# Patient Record
Sex: MALE | Race: WHITE | NOT HISPANIC OR LATINO | Employment: FULL TIME | URBAN - METROPOLITAN AREA
[De-identification: names, ages, dates, MRNs, and addresses within clinical notes are randomized per-mention and may not be internally consistent; named-entity substitution may affect disease eponyms.]

---

## 2023-07-14 PROBLEM — H69.90 EUSTACHIAN TUBE DYSFUNCTION: Status: ACTIVE | Noted: 2023-07-14

## 2023-07-14 PROBLEM — H93.19 TINNITUS: Status: ACTIVE | Noted: 2023-07-14

## 2023-07-14 RX ORDER — MELOXICAM 15 MG/1
15 TABLET ORAL DAILY
COMMUNITY
End: 2023-07-21 | Stop reason: ALTCHOICE

## 2023-07-20 ASSESSMENT — PROMIS GLOBAL HEALTH SCALE
CARRYOUT_PHYSICAL_ACTIVITIES: MOSTLY
EMOTIONAL_PROBLEMS: RARELY
CARRYOUT_SOCIAL_ACTIVITIES: VERY GOOD
RATE_MENTAL_HEALTH: VERY GOOD
RATE_SOCIAL_SATISFACTION: VERY GOOD
RATE_QUALITY_OF_LIFE: VERY GOOD
RATE_AVERAGE_FATIGUE: MILD
RATE_GENERAL_HEALTH: VERY GOOD
RATE_PHYSICAL_HEALTH: VERY GOOD
RATE_AVERAGE_PAIN: 2

## 2023-07-21 ENCOUNTER — LAB (OUTPATIENT)
Dept: LAB | Facility: LAB | Age: 43
End: 2023-07-21
Payer: COMMERCIAL

## 2023-07-21 ENCOUNTER — OFFICE VISIT (OUTPATIENT)
Dept: PRIMARY CARE | Facility: CLINIC | Age: 43
End: 2023-07-21
Payer: COMMERCIAL

## 2023-07-21 VITALS
HEART RATE: 68 BPM | SYSTOLIC BLOOD PRESSURE: 122 MMHG | RESPIRATION RATE: 12 BRPM | BODY MASS INDEX: 26.69 KG/M2 | WEIGHT: 208 LBS | OXYGEN SATURATION: 98 % | DIASTOLIC BLOOD PRESSURE: 72 MMHG | HEIGHT: 74 IN

## 2023-07-21 DIAGNOSIS — E78.00 ELEVATED LDL CHOLESTEROL LEVEL: ICD-10-CM

## 2023-07-21 DIAGNOSIS — Z12.5 SCREENING FOR PROSTATE CANCER: ICD-10-CM

## 2023-07-21 DIAGNOSIS — R73.01 IMPAIRED FASTING GLUCOSE: ICD-10-CM

## 2023-07-21 DIAGNOSIS — G89.29 CHRONIC BILATERAL LOW BACK PAIN WITHOUT SCIATICA: ICD-10-CM

## 2023-07-21 DIAGNOSIS — M54.50 CHRONIC BILATERAL LOW BACK PAIN WITHOUT SCIATICA: ICD-10-CM

## 2023-07-21 DIAGNOSIS — Z00.00 HEALTHCARE MAINTENANCE: Primary | ICD-10-CM

## 2023-07-21 LAB
ALANINE AMINOTRANSFERASE (SGPT) (U/L) IN SER/PLAS: 19 U/L (ref 10–52)
ALBUMIN (G/DL) IN SER/PLAS: 4.8 G/DL (ref 3.4–5)
ALKALINE PHOSPHATASE (U/L) IN SER/PLAS: 62 U/L (ref 33–120)
ANION GAP IN SER/PLAS: 11 MMOL/L (ref 10–20)
APPEARANCE, URINE: CLEAR
ASPARTATE AMINOTRANSFERASE (SGOT) (U/L) IN SER/PLAS: 16 U/L (ref 9–39)
BILIRUBIN TOTAL (MG/DL) IN SER/PLAS: 0.5 MG/DL (ref 0–1.2)
BILIRUBIN, URINE: NEGATIVE
BLOOD, URINE: NEGATIVE
CALCIUM (MG/DL) IN SER/PLAS: 10.2 MG/DL (ref 8.6–10.6)
CARBON DIOXIDE, TOTAL (MMOL/L) IN SER/PLAS: 30 MMOL/L (ref 21–32)
CHLORIDE (MMOL/L) IN SER/PLAS: 106 MMOL/L (ref 98–107)
CHOLESTEROL (MG/DL) IN SER/PLAS: 190 MG/DL (ref 0–199)
CHOLESTEROL IN HDL (MG/DL) IN SER/PLAS: 49.8 MG/DL
CHOLESTEROL/HDL RATIO: 3.8
COLOR, URINE: YELLOW
CREATININE (MG/DL) IN SER/PLAS: 0.94 MG/DL (ref 0.5–1.3)
GFR MALE: >90 ML/MIN/1.73M2
GLUCOSE (MG/DL) IN SER/PLAS: 95 MG/DL (ref 74–99)
GLUCOSE, URINE: NEGATIVE MG/DL
KETONES, URINE: NEGATIVE MG/DL
LDL: 118 MG/DL (ref 0–99)
LEUKOCYTE ESTERASE, URINE: NEGATIVE
NITRITE, URINE: NEGATIVE
PH, URINE: 5 (ref 5–8)
POTASSIUM (MMOL/L) IN SER/PLAS: 5.2 MMOL/L (ref 3.5–5.3)
PROSTATE SPECIFIC ANTIGEN,SCREEN: 0.71 NG/ML (ref 0–4)
PROTEIN TOTAL: 7.6 G/DL (ref 6.4–8.2)
PROTEIN, URINE: NEGATIVE MG/DL
SODIUM (MMOL/L) IN SER/PLAS: 142 MMOL/L (ref 136–145)
SPECIFIC GRAVITY, URINE: 1.02 (ref 1–1.03)
TRIGLYCERIDE (MG/DL) IN SER/PLAS: 110 MG/DL (ref 0–149)
UREA NITROGEN (MG/DL) IN SER/PLAS: 16 MG/DL (ref 6–23)
UROBILINOGEN, URINE: <2 MG/DL (ref 0–1.9)
VLDL: 22 MG/DL (ref 0–40)

## 2023-07-21 PROCEDURE — 90707 MMR VACCINE SC: CPT | Performed by: FAMILY MEDICINE

## 2023-07-21 PROCEDURE — 1036F TOBACCO NON-USER: CPT | Performed by: FAMILY MEDICINE

## 2023-07-21 PROCEDURE — 90471 IMMUNIZATION ADMIN: CPT | Performed by: FAMILY MEDICINE

## 2023-07-21 PROCEDURE — 80061 LIPID PANEL: CPT

## 2023-07-21 PROCEDURE — 99396 PREV VISIT EST AGE 40-64: CPT | Performed by: FAMILY MEDICINE

## 2023-07-21 PROCEDURE — 80053 COMPREHEN METABOLIC PANEL: CPT

## 2023-07-21 PROCEDURE — 81003 URINALYSIS AUTO W/O SCOPE: CPT

## 2023-07-21 PROCEDURE — 36415 COLL VENOUS BLD VENIPUNCTURE: CPT

## 2023-07-21 PROCEDURE — 84153 ASSAY OF PSA TOTAL: CPT

## 2023-07-21 RX ORDER — METHOCARBAMOL 500 MG/1
500 TABLET, FILM COATED ORAL 3 TIMES DAILY
Qty: 40 TABLET | Refills: 0
Start: 2023-07-21 | End: 2024-03-11 | Stop reason: WASHOUT

## 2023-07-21 ASSESSMENT — ENCOUNTER SYMPTOMS
FATIGUE: 0
ABDOMINAL DISTENTION: 0
ADENOPATHY: 0
BLOOD IN STOOL: 0
NERVOUS/ANXIOUS: 0
BRUISES/BLEEDS EASILY: 0
CHILLS: 0
WEAKNESS: 0
FEVER: 0
BACK PAIN: 1
SORE THROAT: 0
DIARRHEA: 0
DYSURIA: 0
HEADACHES: 0
APPETITE CHANGE: 0
EYE REDNESS: 0
ARTHRALGIAS: 0
SHORTNESS OF BREATH: 0
DYSPHORIC MOOD: 0
CHEST TIGHTNESS: 0
DIZZINESS: 0
DIFFICULTY URINATING: 0
ABDOMINAL PAIN: 0
COUGH: 0
EYE PAIN: 0
CONSTIPATION: 0

## 2023-07-21 NOTE — PROGRESS NOTES
"Subjective   Patient ID: Austin Seo is a 42 y.o. male who presents for Annual Exam.  PMHX, PSHx, Fam hx, and Social hx reviewed.   New concerns - continues to have intermittent low back strains 1-2x per year. Responds to Robaxin as needed. Odynophagia unchanged and had prior negative mitchell with barium swallow study and EGD.   Vaccines MMR due  Dentist seen at least yearly yes  Vision concerns none  Hearing concerns none  Diet is overall healthy.   Smoker - no  Alcohol use - 2 beers per week  Exercising 4 days per week.   Sexually active - yes, no issues  Colonoscopy NA           Review of Systems   Constitutional:  Negative for appetite change, chills, fatigue and fever.   HENT:  Negative for congestion, hearing loss and sore throat.    Eyes:  Negative for pain, redness and visual disturbance.   Respiratory:  Negative for cough, chest tightness and shortness of breath.    Cardiovascular:  Negative for chest pain and leg swelling.   Gastrointestinal:  Negative for abdominal distention, abdominal pain, blood in stool, constipation and diarrhea.   Genitourinary:  Negative for difficulty urinating and dysuria.   Musculoskeletal:  Positive for back pain. Negative for arthralgias.   Skin:  Negative for rash.   Neurological:  Negative for dizziness, weakness and headaches.   Hematological:  Negative for adenopathy. Does not bruise/bleed easily.   Psychiatric/Behavioral:  Negative for dysphoric mood. The patient is not nervous/anxious.        Objective   /72   Pulse 68   Resp 12   Ht 1.88 m (6' 2\")   Wt 94.3 kg (208 lb)   SpO2 98%   BMI 26.71 kg/m²    Physical Exam  Constitutional:       General: He is not in acute distress.     Appearance: Normal appearance. He is not ill-appearing.   HENT:      Head: Normocephalic and atraumatic.      Right Ear: Tympanic membrane, ear canal and external ear normal.      Left Ear: Tympanic membrane, ear canal and external ear normal.      Nose: Nose normal.      Mouth/Throat: "      Mouth: Mucous membranes are moist.      Pharynx: No oropharyngeal exudate or posterior oropharyngeal erythema.   Eyes:      Extraocular Movements: Extraocular movements intact.      Conjunctiva/sclera: Conjunctivae normal.      Pupils: Pupils are equal, round, and reactive to light.   Neck:      Vascular: No carotid bruit.   Cardiovascular:      Rate and Rhythm: Normal rate and regular rhythm.      Heart sounds: Normal heart sounds. No murmur heard.  Pulmonary:      Effort: Pulmonary effort is normal.      Breath sounds: Normal breath sounds. No wheezing, rhonchi or rales.   Abdominal:      General: Bowel sounds are normal. There is no distension.      Palpations: Abdomen is soft. There is no mass.      Tenderness: There is no abdominal tenderness.   Genitourinary:     Prostate: Not enlarged and no nodules present.      Rectum: Guaiac result negative.   Musculoskeletal:         General: No swelling or deformity.      Cervical back: Neck supple. No tenderness.   Lymphadenopathy:      Cervical: No cervical adenopathy.   Skin:     General: Skin is warm and dry.      Findings: No lesion or rash.   Neurological:      Mental Status: He is alert and oriented to person, place, and time.      Sensory: No sensory deficit.      Motor: No weakness.      Coordination: Coordination normal.      Deep Tendon Reflexes: Reflexes normal.   Psychiatric:         Mood and Affect: Mood normal.         Behavior: Behavior normal.         Judgment: Judgment normal.           Assessment/Plan   Diagnoses and all orders for this visit:  Healthcare maintenance - MMR shot given, other vaccines current. Recheck fasting labs.   Chronic bilateral low back pain without sciatica - continue Methocarbamol as needed  Impaired fasting glucose/Elevated LDL cholesterol level - recheck with labs.     Follow up in 1 year, 30min for physical

## 2023-07-21 NOTE — PROGRESS NOTES
"Subjective   Patient ID: Austin Seo is a 42 y.o. male who presents for Annual Exam.    HPI     Review of Systems    Objective   /72   Pulse 68   Resp 12   Ht 1.88 m (6' 2\")   Wt 94.3 kg (208 lb)   SpO2 98%   BMI 26.71 kg/m²     Physical Exam    Assessment/Plan          "

## 2023-07-24 ENCOUNTER — TELEPHONE (OUTPATIENT)
Dept: PRIMARY CARE | Facility: CLINIC | Age: 43
End: 2023-07-24
Payer: COMMERCIAL

## 2023-07-24 NOTE — TELEPHONE ENCOUNTER
----- Message from Mendoza Lang MD sent at 7/22/2023  7:52 AM EDT -----  FBS improved, in normal range. LDL is elevated - recommend low cholesterol diet and regular exercise. Other labs look good.

## 2024-03-11 ENCOUNTER — OFFICE VISIT (OUTPATIENT)
Dept: PRIMARY CARE | Facility: CLINIC | Age: 44
End: 2024-03-11
Payer: COMMERCIAL

## 2024-03-11 VITALS
BODY MASS INDEX: 27.46 KG/M2 | DIASTOLIC BLOOD PRESSURE: 72 MMHG | OXYGEN SATURATION: 96 % | RESPIRATION RATE: 12 BRPM | HEIGHT: 74 IN | SYSTOLIC BLOOD PRESSURE: 126 MMHG | HEART RATE: 78 BPM | WEIGHT: 214 LBS

## 2024-03-11 DIAGNOSIS — R06.83 SNORING: Primary | ICD-10-CM

## 2024-03-11 DIAGNOSIS — Z00.00 HEALTHCARE MAINTENANCE: ICD-10-CM

## 2024-03-11 DIAGNOSIS — R40.0 DAYTIME SOMNOLENCE: ICD-10-CM

## 2024-03-11 DIAGNOSIS — R06.81 WITNESSED APNEIC SPELLS: ICD-10-CM

## 2024-03-11 PROCEDURE — 1036F TOBACCO NON-USER: CPT | Performed by: FAMILY MEDICINE

## 2024-03-11 PROCEDURE — 99213 OFFICE O/P EST LOW 20 MIN: CPT | Performed by: FAMILY MEDICINE

## 2024-03-11 RX ORDER — BISMUTH SUBSALICYLATE 262 MG
1 TABLET,CHEWABLE ORAL DAILY
COMMUNITY

## 2024-03-11 ASSESSMENT — ENCOUNTER SYMPTOMS
FATIGUE: 0
ABDOMINAL DISTENTION: 0
CHEST TIGHTNESS: 0
WEAKNESS: 0
ARTHRALGIAS: 0
BACK PAIN: 0
CHILLS: 0
CONSTIPATION: 0
SHORTNESS OF BREATH: 0
HEADACHES: 0
DYSURIA: 0
BLOOD IN STOOL: 0
ADENOPATHY: 0
DIZZINESS: 0
FEVER: 0
DYSPHORIC MOOD: 0
SORE THROAT: 0
ABDOMINAL PAIN: 0
NERVOUS/ANXIOUS: 0
DIARRHEA: 0
DIFFICULTY URINATING: 0
APPETITE CHANGE: 0
EYE REDNESS: 0
EYE PAIN: 0
COUGH: 0
BRUISES/BLEEDS EASILY: 0

## 2024-03-11 NOTE — PROGRESS NOTES
"Subjective   Patient ID: Austin Seo is a 43 y.o. male who presents for Snoring.    HPI     Review of Systems    Objective   /72   Pulse 78   Resp 12   Ht 1.88 m (6' 2\")   Wt 97.1 kg (214 lb)   SpO2 96%   BMI 27.48 kg/m²     Physical Exam    Assessment/Plan          "

## 2024-03-11 NOTE — PROGRESS NOTES
"Subjective   Patient ID: Austin Seo is a 43 y.o. male who presents for Snoring.  Pt has loud snoring chronically. Also duaghter noted long pauses between breaths and gasping while asleep . Daytime energy is mildly low.  Pt reports symptoms are worsening.   It occurs with frequency of daily  .   Pt has not tried anything.         Review of Systems   Constitutional:  Negative for appetite change, chills, fatigue and fever.   HENT:  Negative for congestion, hearing loss and sore throat.    Eyes:  Negative for pain, redness and visual disturbance.   Respiratory:  Negative for cough, chest tightness and shortness of breath.    Cardiovascular:  Negative for chest pain and leg swelling.   Gastrointestinal:  Negative for abdominal distention, abdominal pain, blood in stool, constipation and diarrhea.   Genitourinary:  Negative for difficulty urinating and dysuria.   Musculoskeletal:  Negative for arthralgias and back pain.   Skin:  Negative for rash.   Neurological:  Negative for dizziness, weakness and headaches.   Hematological:  Negative for adenopathy. Does not bruise/bleed easily.   Psychiatric/Behavioral:  Negative for dysphoric mood. The patient is not nervous/anxious.        Objective   /72   Pulse 78   Resp 12   Ht 1.88 m (6' 2\")   Wt 97.1 kg (214 lb)   SpO2 96%   BMI 27.48 kg/m²    Physical Exam  Constitutional:       General: He is not in acute distress.     Appearance: Normal appearance.   HENT:      Right Ear: Tympanic membrane normal.      Left Ear: Tympanic membrane normal.      Nose: No congestion.      Mouth/Throat:      Pharynx: No posterior oropharyngeal erythema.      Comments: Low lying soft palate  Cardiovascular:      Rate and Rhythm: Normal rate and regular rhythm.      Heart sounds: Normal heart sounds. No murmur heard.  Pulmonary:      Effort: Pulmonary effort is normal.      Breath sounds: Normal breath sounds.   Abdominal:      Palpations: Abdomen is soft.      Tenderness: There " is no abdominal tenderness.   Neurological:      Mental Status: He is alert.   Psychiatric:         Mood and Affect: Mood normal.         Judgment: Judgment normal.         Assessment/Plan   Diagnoses and all orders for this visit:  Snoring/Daytime somnolence/Witnessed apneic spells- check home sleep study.

## 2024-03-16 ENCOUNTER — CLINICAL SUPPORT (OUTPATIENT)
Dept: SLEEP MEDICINE | Facility: HOSPITAL | Age: 44
End: 2024-03-16
Payer: COMMERCIAL

## 2024-03-16 DIAGNOSIS — R06.83 SNORING: ICD-10-CM

## 2024-03-16 DIAGNOSIS — R06.81 WITNESSED APNEIC SPELLS: ICD-10-CM

## 2024-03-16 DIAGNOSIS — R40.0 DAYTIME SOMNOLENCE: ICD-10-CM

## 2024-03-16 PROCEDURE — 95806 SLEEP STUDY UNATT&RESP EFFT: CPT | Performed by: PSYCHIATRY & NEUROLOGY

## 2024-03-28 DIAGNOSIS — G47.33 OSA (OBSTRUCTIVE SLEEP APNEA): Primary | ICD-10-CM

## 2024-03-29 NOTE — PROGRESS NOTES
[facility]  [address]  [facility]  [address]  [place] [id number]     [facility]  New Visit Note      Subjective   Patient ID: Austin Seo is a 43 y.o. male with past medical history significant for Overweight, Obstructive sleep apnea     4/4/2024: The patient is here alone today and was referred by PCP Mendoza Lang MD, for comprehensive sleep medicine evaluation due to  Snoring. Pt has loud snoring chronically. Also, his daughter noted long pauses between breaths and gasping while asleep. Daytime energy is mildly low. The reports symptoms are worsening. It occurs with a frequency of daily. Pt has not tried anything. Today, he came to the sleep clinic to establish care to treat his Obstructive sleep apnea. His  ESS is 9, and  MIKE is 4 today.      HPI  Patient had been having these symptoms for the past 10 years. Patient had Home Sleep Study in 2024 which showed JESSICA but no CPAP started yet.      SLEEP STUDY HISTORY: (personally reviewed raw data such as interpretation report, data sheet, hypnogram, and titration table if available and applicable)  3/16/24: Home Sleep Study: AHI 3%: 35.9/hr, Supine AHI 3%: 41.4/hr, Luis: 66.7%, <88%: 129.9 minutes.    SLEEP-WAKE SCHEDULE  Bedtime: 11 PM on weekdays, MN on weekends  Subjective sleep latency: 10 minutes  Problems falling asleep: no  Number of awakenings: 0 times per night spontaneously for unknown reasons  Problems staying asleep: no  Final wake time: 6 AM on weekdays, 8 AM on weekends  Shift work: no  Naps: No  Average sleep duration (excluding naps): 7 hours    SLEEP ENVIRONMENT  Sleep location: bed  Sleep status: sleeps with wife  Room is dark:  Yes  Room is quiet: Yes  Room is cool: Yes  Bed comfort: good    SLEEP HABITS:   Activities before bedtime: watch TV  Activities in bed: watch TV  Preferred sleep position: back    SLEEP  ROS:  Night symptoms: POSITIVE for snoring, witnessed apnea, and wake up gasping and/or choking for air  Morning symptoms: POSITIVE for unrefreshing sleep  Daytime symptoms POSITIVE for excessive daytime sleepiness and fatigue  Hypersomnia / narcolepsy symptoms: Patient denies symptoms of a hypersomnolence disorder such as sleep paralysis, sleep-related hallucinations, recurrent sleep attacks, automatic behaviors, and cataplexy.   RLS symptoms: Patient denies RLS symptoms.  Movements in sleep: Patient denies problematic movements in sleep.  Parasomnia symptoms: Patient denies symptoms of parasomnia.    WEIGHT: stable    REVIEW OF SYSTEMS: All other systems have been reviewed and are negative.    PERTINENT SOCIAL HISTORY:  Occupation: Manager at [employer]   Smoking: Yes   ETOH: No   Marijuana: No   Caffeine: Yes  Sleep aids: No   Claustrophobia: No     PERTINENT PAST SURGICAL HISTORY:  tonsillectomy    PERTINENT FAMILY HISTORY:  Cousin: Obstructive sleep apnea with CPAP    Active Problems, Allergy List, Medication List, and PMH/PSH/FH/Social Hx have been reviewed and reconciled in chart. No significant changes unless documented in the pertinent chart section. Updates made when necessary.       Objective   Vitals:    04/04/24 1405   BP: 109/68   Pulse: 76   Resp: 16   Temp: 36.6 °C (97.8 °F)   SpO2: 97%        Physical Exam  Constitutional:alert and oriented to time, place, and person  Sinus: - tenderness to palpation  Palate: Narrow Mallampati 2, - Tongue scalloping, - macroglossia  Lungs: Clear to auscultation bilateral, no rales  Heart: Regular rate and rhythm, no murmurs    Assessment/Plan   Austin Seo is a 43 y.o. male who is seen to evaluate for severe obstructive sleep apnea. The pathophysiology of sleep apnea, diagnostic testing (HST vs PSG), treatment options (PAP, oral appliance, surgery, hypoglossal nerve stimulator called Inspire), and supportive management (weight loss, positional therapy,  smoking cessation, avoidance of alcohol and sedatives) were discussed with the patient in detail. Risk factors of sleep apnea as well as cardiometabolic and neurocognitive sequelae associated with untreated sleep apnea were also discussed. Lastly, patient was advised to avoid driving vehicle or operating heavy machinery when sleepy.     Austin Seo with the following problems:     # OBSTRUCTIVE SLEEP APNEA: AHI 3%: 35.9/hr, Supine AHI 3%: 41.4/hr  -Start 5-15 cmH20  auto CPAP through [employer].(Expedite it)  -Sleep apnea and PAP therapy education were provided at length in the clinic today. Austin Seo verbalized understanding.  -Emphasized diet, exercise, and weight loss in the clinic, as were non-supine sleep, avoiding alcohol in the late evening, and driving or operating heavy machinery when sleepy.  Austin Seoverbalizes understanding of the above instructions and risks.      # OVERWEIGHT:  -with a BMI of 27.99. Austin Seo most recent Bicarbonate was 30 on 7/21/23.  Bicarbonate   Date Value Ref Range Status   07/21/2023 30 21 - 32 mmol/L Final   -Encourage to have regular exercise to manage weight well.    # NASAL CONGESTION:  -Instruct Austin Seoto use appropriate Flonase spray to ease congestion.    RTC 1 month after receiving CPAP       All of patient's questions were answered. He verbalizes understanding and agreement with my assessment and plan.

## 2024-04-04 ENCOUNTER — OFFICE VISIT (OUTPATIENT)
Dept: SLEEP MEDICINE | Facility: CLINIC | Age: 44
End: 2024-04-04
Payer: COMMERCIAL

## 2024-04-04 VITALS
DIASTOLIC BLOOD PRESSURE: 68 MMHG | WEIGHT: 218 LBS | HEIGHT: 74 IN | HEART RATE: 76 BPM | OXYGEN SATURATION: 97 % | RESPIRATION RATE: 16 BRPM | BODY MASS INDEX: 27.98 KG/M2 | TEMPERATURE: 97.8 F | SYSTOLIC BLOOD PRESSURE: 109 MMHG

## 2024-04-04 DIAGNOSIS — G47.33 OBSTRUCTIVE SLEEP APNEA (ADULT) (PEDIATRIC): ICD-10-CM

## 2024-04-04 DIAGNOSIS — G47.33 OSA (OBSTRUCTIVE SLEEP APNEA): Primary | ICD-10-CM

## 2024-04-04 DIAGNOSIS — E66.3 OVERWEIGHT (BMI 25.0-29.9): ICD-10-CM

## 2024-04-04 DIAGNOSIS — R09.81 NASAL CONGESTION: ICD-10-CM

## 2024-04-04 PROCEDURE — 99214 OFFICE O/P EST MOD 30 MIN: CPT

## 2024-04-04 PROCEDURE — 1036F TOBACCO NON-USER: CPT

## 2024-04-04 PROCEDURE — 99204 OFFICE O/P NEW MOD 45 MIN: CPT

## 2024-04-04 ASSESSMENT — SLEEP AND FATIGUE QUESTIONNAIRES
HOW LIKELY ARE YOU TO NOD OFF OR FALL ASLEEP WHILE SITTING QUIETLY AFTER LUNCH WITHOUT ALCOHOL: SLIGHT CHANCE OF DOZING
HOW LIKELY ARE YOU TO NOD OFF OR FALL ASLEEP WHILE WATCHING TV: SLIGHT CHANCE OF DOZING
HOW LIKELY ARE YOU TO NOD OFF OR FALL ASLEEP WHILE SITTING AND TALKING TO SOMEONE: WOULD NEVER DOZE
HOW LIKELY ARE YOU TO NOD OFF OR FALL ASLEEP WHILE LYING DOWN TO REST IN THE AFTERNOON WHEN CIRCUMSTANCES PERMIT: HIGH CHANCE OF DOZING
HOW LIKELY ARE YOU TO NOD OFF OR FALL ASLEEP IN A CAR, WHILE STOPPED FOR A FEW MINUTES IN TRAFFIC: WOULD NEVER DOZE
HOW LIKELY ARE YOU TO NOD OFF OR FALL ASLEEP WHEN YOU ARE A PASSENGER IN A CAR FOR AN HOUR WITHOUT A BREAK: WOULD NEVER DOZE
ESS-CHAD TOTAL SCORE: 9
HOW LIKELY ARE YOU TO NOD OFF OR FALL ASLEEP WHILE SITTING AND READING: HIGH CHANCE OF DOZING
SITING INACTIVE IN A PUBLIC PLACE LIKE A CLASS ROOM OR A MOVIE THEATER: SLIGHT CHANCE OF DOZING

## 2024-04-04 NOTE — PATIENT INSTRUCTIONS
[facility]  POR [address]  [facility]  [address]  [facility] [address]       Thank you for coming to the Sleep Medicine Clinic today! Your sleep medicine provider today was: JESUS Barnett Below is a summary of your treatment plan, patient education, other important information, and our contact numbers.    Dear Austin Seo,    Thank you for visiting  Sleep Medicine Clinic !     1. According to your symptom and sleep study report. I will order the new PAP device for you to control your sleep apnea, feel free to contact your DME. If [employer] is your DME, you can reach them at [phone].   2. Please do not drive when you are sleepy and  start practicing the sleep hygiene  as discussed in clinic today.     3. Please start practicing the appropriate nasal spray at night to ease your nasal congestion as discussed in clinic today if needed.    4. FOR QUESTIONS AND CONCERNS:   a) : In case of problems with machine or mask interface, please contact your DME company first. DME is the company who provides you the machine and/or PAP supplies / accessories. If [employer] is your DME, you can reach them at [phone].   b): Please call my office with issues or questions: [phone] ([place]); [phone] ([facility]); [phone] ([facility])    If you have a CPAP or BiPAP machine at home, please bring the unit and all accessories including the power cord to your appointments unless I tell you otherwise. Please have knowledge of the DME company you worked with to receive your PAP device. If you have copies of any previous sleep testing completed outside of , please bring with you to clinic as well. This information will make our visits more productive.     If you are new to CPAP or BiPAP, please note the minimum usage insurance requires to continuing coverage for the equipment as noted by your DME  company. Please discuss equipment issues (PAP unit, mask fit, humidification, etc.) with your DME company first.       In the event that you are running more than 10 minutes late to your appointment, I will kindly ask you to reschedule. Thanks.      TREATMENT PLAN     Follow-up Appointment:   Follow-up in 31 days after getting new machine.    PATIENT EDUCATION     Obstructive Sleep Apnea (JESSICA) is a sleep disorder where your upper airway muscles relax during sleep and the airway intermittently and repetitively narrows and collapses leading to partially blocked airway (hypopnea) or completely blocked airway (apnea) which, in turn, can disrupt breathing in sleep, lower oxygen levels while you sleep and cause night time wakings. Because both apnea and hypopnea may cause higher carbon dioxide or low oxygen levels, untreated JESSICA can lead to heart arrhythmia, elevation of blood pressure, and make it harder for the body to consolidate memory and facilitate metabolism (leading to higher blood sugars at night). Frequent partial arousals occur during sleep resulting in sleep deprivation and daytime sleepiness. JESSICA is associated with an increased risk of cardiovascular disease, stroke, hypertension, and insulin resistance. Moreover, untreated JESSICA with excessive daytime sleepiness can increase the risk of motor vehicular accidents.    Some conservative strategies for JESSICA regardless of JESSICA severity are:   Positional therapy - Avoid sleeping on your back.   Healthy diet and regular exercise to optimize weight is highly encouraged.   Avoid alcohol late in the evening and sedative-hypnotics as these substances can make sleep apnea worse.   Improve breathing through the nose with intranasal steroid spray, saline rinse, or antihistamines    Safety: Avoid driving vehicle and operating heavy equipment while sleepy. Drowsy driving may lead to life-threatening motor vehicle accidents. A person driving while sleepy is 5 times more likely  to have an accident. If you feel sleepy, pull over and take a short power nap (sleep for less than 30 minutes). Otherwise, ask somebody to drive you.    Treatment options for sleep apnea include weight management, positional therapy, Positive Airway Therapy (PAP) therapy, oral appliance therapy, hypoglossal nerve stimulator (Inspire) and select airway surgeries.    Starting Positive Airway Pressure (PAP): You were ordered a device to wear when you sleep called PAP (Positive Airway Pressure) to treat your sleep apnea. The order will be submitted to a durable medical equipment (DME) company who will arrange setting you up with the device. They will provide all the necessary equipment and discuss use and maintenance of the device with you as well as mask fitting and process of replacing / renewing PAP supplies or accessories. Once you get the machine, please start using it immediately. You may not be successful right away and that is okay. Raffy be certain that you keep trying nightly and reach out to DME if you are struggling or having issues with machine usage.     *Please follow-up with me in 1-2 months of starting CPAP to see how well it is working for you and to do some troubleshooting if needed. Also, please bring all PAP equipment with you to follow up appointments unless told otherwise.     Important things to keep in mind as you start PAP:  Insurance will monitor your usage during the first 90 days. You should use your PAP - all night, every night, and including all naps (especially if naps are more than 30 minutes) for your health. The bare minimum is to use your PAP device while sleeping for at least 4 hours per day at least 5-6 days per week.. Otherwise, your PAP device will be reclaimed by your DME company at 90 days.  There are many masks to choose from to wear with your PAP machine. If you are not comfortable with the first mask issued to you, call your DME company and ask for another option to try. You  typically have a 30-day mask guarantee from the day you received your machine.   Discuss with your provider if you are having issues breathing with the machine or if the temperature or humidity feel uncomfortable.  Expect to have an adjustment period when you start your device. It helps to continuing wearing the machine every day for a period of time until you get more used to it. You can practice with wearing the mask alone if you need, then add in the PAP air pressure a few days later.   Reach out for help if you are struggling! The sleep medicine department can be reached at [phone]  We encourage you to download data monitoring apps to your phone. For [employer] 10/11 - Medcurrent meggan. For [employer] - DreamMapper. Both apps are available in the Meggan store for free and are a great tool to monitor your progress with your PAP device night to night.    Tips for success with PAP machine usage:  Comfortable and well-fitting mask  Appropriate pressure on the machine  Using humidification  Support from bed partner and clinical team      Maintaining your CPAP/BPAP device:    The humidification chamber (aka water tank or water chamber) needs to be filled with distilled water to prevent buildup of white deposits in the future. If you cannot find distilled water, you can use tap water but expect to have white deposits buildup seen after prolonged use with tap water. If you start seeing white deposits on the water chamber, you can clean it by filling it with equal parts of distilled white vinegar and water. Let the vinegar-water mixture sit for 2 hours, and then rinse it with running tap water. Clean with soap and water then let it dry.     You should try to keep your machine clean in order to work well. Here are some tips to clean PAP supplies / accessories:    Clean the humidification chamber (aka water tank) as well as your mask and tubing at least once a week with soap and water.   Alternatively, you  can fill a sink or basin with warm water and add a little mild detergent, like Ivory dish soap. Gently wipe your supplies with the soapy water to free all the oils and dirt that may have collected. Once that's done, rinse these items with clean water until the soap is gone and let them air dry. You can hang your tubing over the curtain juli in your bathroom so that it dries.  The mask insert (part of the mask that has contact with your skin) needs to be cleaned with soap and water daily. Another option is to wipe them down with CPAP wipes or baby wipes.    You should replace your mask and tubing frequently in order to prevent bacteria buildup, machine damage, and mask seal issues. The older the mask and hose, the high likelihood that there is bacteria buildup in it especially if they are not cleaned regularly. Dirty filters damage machines because build-up of dust and contaminants can cause machine to over-heat, and in time, damage the motor of machine. Cushions lose their seal over time as most masks are made of plastic and silicone while headgear is made of neoprene. These materials will break down with age and frequent use. Here is the recommended replacement schedule for PAP supplies / accessories:    Twice a month- disposable filters and cushions for nasal mask or nasal pillows.  Once a month- cushion for full face mask  Every 3 months- mask with headgear and PAP tubing (standard or heated hose)  Every 6 months- reusable filter, water chamber, and chin strap     Other useful information:    Some people do not put water in the tank while other people prefers to put water in the tank to prevent mouth dryness. Try to experiment to determine which is more comfortable for you.   In general, new machines have 2 years warranty on parts while health insurance allows you to have a new machine once every 5 years.     Common issues with PAP machine:    Mask gets dislodged when turning to the side: Consider getting a CPAP  "pillow or switching to a mask with hose on top.     Dry mouth:  Your machine has built-in humidifier that heats up the air to prevent dry mouth. It can be adjusted to your comfort. You can try that first and increase setting one level one night at a time to check which setting is comfortable and effective in lessening dry mouth. In some patients with heated hose, adjusting tube temperature to make air warmer can improve dry mouth. If dry mouth persists despite adjusting humidity or tube temperature setting, may apply OTC Biotene gel over the gums at bedtime.  If Biotene gel is not effective, consider trying XEROSTOM gel from [employer].  Also, eliminate or reduce dose of medications that can cause dry mouth if possible. Lastly, may try getting a separate room humidifier machine.    Airleaks: Please call DME as they may need to adjust your mask or refit you with a different kind or different size of mask. In addition, you can ask DME for tips on getting a good mask seal and mask fit.     Difficulty tolerating the mask: Contact your DME to try a different kind of mask and/or call office to get a referral to Sleep Psychologist for CPAP desensitization. CPAP desensitization technique is a set of strategies that helps patient cope with claustrophobia and anxiety related to wearing mask. Alternatively, we can do a daytime mini-sleep study called PAP-nap trial wherein you will try on different kinds of mask and the sleep technician will try different pressure settings on CPAP and BPAP machines to see which specific pressure is tolerable and comfortable for you.     Water droplets or moisture within the hose and/or mask: This is called rain-out and it is caused by condensation of too much heated humidity on the cooler walls of the hose. If you have rain-out, turn down humidity settings or get a heated hose. If you already have a heated hose, turn up the \"tube temperature\" of the heated hose. Alternatively, if you don't want " to get a heated hose or warmer air, may wrap the CPAP hose with stockings to keep it somewhat warm. Also, you need to place the machine on the floor and lower the hose so that water won't travel upward towards your mask.     PAP desensitization techniques: If you have concerns about something being on your face at night, you can start by getting used to it before trying to sleep with it as follows:      Sit in a comfortable chair or bed. Connect the mask and hose to the CPAP/BiPAP machine. Hold the mask on your face (without straps on) and turn on the machine. Practice breathing with the mask on while awake sitting and watching television, reading, or performing a sedentary activity during the day for 5-10 minutes and then take it off.  If tolerated, try again and gradually build up to longer periods of time. If not tolerated, try and try again until it is more comfortable as you become more desensitized. If you are able to use it for at least 20-30 minutes, move unto the next step.     Sit in a comfortable chair or bed. Connect the mask and hose to the CPAP/BiPAP machine. Strap the mask on your head and turn on the machine. Practice breathing with the mask and headgear on while awake sitting and watching television, reading, or performing a sedentary activity. Start with 5-10 minutes and gradually increasing time until you can wear it comfortably for at least 20-30 minutes, then move to the next step.    Take a shorter daytime nap with machine turned on while you are in a reclined position in bed, sofa, or recliner. Start with 5-10 minute nap and gradually increase up to 30 minutes. It is not important whether you fall asleep or not. The goal is to rest comfortably with PAP machine on.     Reintroduce PAP machine into nighttime sleep. You can begin using it a portion of the night and gradually increase up to entire night.     Proceed from one step to the next only when you are completely comfortable. If you feel  any anxiety or discomfort, return to the previous step, then proceed again when comfortable.    Expect to “work” with your CPAP/BIPAP unit. It is important to try to relax when beginning CPAP/BIPAP therapy. Inhalation and exhalation should occur through the nose only. If you are unable to consistently breathe this way, do not panic or lose hope. There are other types of masks which allow you to breathe through your nose and/or your mouth. Also, in some patients, using intranasal steroid spray (e.g. Flonase or Nasocort or Fluticasone) 1 hour before bedtime and/or before putting on CPAP mask can help tolerate breathing through the mask.    Don't give up after a few attempts--some patients adjust quickly, while some patients need 3-4 weeks (or sometimes even longer) to be accustomed to CPAP therapy.  Contact your sleep medicine specialist if you have a significant change in weight since this may affect your pressure.    How to use nasal sprays properly: If you have nasal congestion or allergies, improving your breathing through the nose is critical for treating sleep apnea and improving your sleep. Hence, intranasal steroid spray like Flonase or Nasocort (generic name is Fluticasone) might help. In some patients with sleep apnea, using intranasal steroid spray allowed them to tolerate CPAP mask better.     Intranasal steroids are usually prescribed as 2 sprays per nostril 1 hour before bedtime. If you administer intranasal steroids too close to bedtime, it might not work as well.  If you have nasal congestion or allergies during day despite using Flonase, try adding Azelastine nasal spray 2 sprays per nostril in the morning. Alternatively, can consider adding over-the-counter non-sedating antihistamine medications.     However, if you have severe nasal congestion or allergies despite using both nasal sprays, consider seeing an allergy specialist to confirm which substance you are sensitive to and also get definitive  treatment which may be in the form of injections, oral pills, different kind of nose sprays, and/or a combination of everything said.     Below are instructions on how to use intranasal steroid spray properly:  Sit up or stand up with your face down.  Shake the spray bottle and insert its tip in one nostril.  Point the spray towards your ear, and not towards the middle of your nose. Pointing the tip upward and in the middle of the nose can lead to discomfort and irritation of nasal mucosa which can lead to nose bleeding or coughing up tinges of blood.  Squeeze the spray bottle and administer the recommended amount of spray.   Don't sniff when you spray. Remove the spray bottle.  Pinch your nose and hold it for a count of 10.   Perform steps 1-6 on the other nostril.    You can also go to the following EDUCATION WEBSITES for further information:   American Academy of Sleep Medicine http://sleepeducation.org  National Sleep Foundation: https://sleepfoundation.org  American Sleep Apnea Association: https://www.sleepapnea.org (for patients with sleep apnea)  Narcolepsy Network: https://www.narcolepsynetwork.org (for patients with narcolepsy)  [employer]: https://www.wakeupnarcolepsy.org (for patients with narcolepsy)  Hypersomnia Foundation: https://www.hypersomniafoundation.org (for patients with idiopathic hypersomnia)  RLS foundation: https://www.rls.org (for patients with restless leg syndrome)    IMPORTANT INFORMATION     Call 911 for medical emergencies.  Our offices are generally open from Monday-Friday, 8 am - 5 pm.   There are no supporting services by either the sleep doctors or their staff on weekends and Holidays, or after 5 PM on weekdays.   If you need to get in touch with me, you may either call my office number or you can use [employer].  If a referral for a test, for CPAP, or for another specialist was made, and you have not heard about scheduling this within a week, please call scheduling at  [phone] ([phone]).  If you are unable to make your appointment for clinic or an overnight study, kindly call the office or sleep testing center at least 48 hours in advance to cancel and reschedule.  If you are on CPAP, please bring your device's card and/or the device to each clinic appointment.   In case of problems with PAP machine or mask interface, please contact your DME (Durable Medical Equipment) company first. DME is the company who provides you the machine and/or PAP supplies.       PRESCRIPTIONS     We require 7 days advanced notice for prescription refills. If we do not receive the request in this time, we cannot guarantee that your medication will be refilled in time.    IMPORTANT PHONE NUMBERS     Sleep Medicine Clinic Fax: [phone]  Appointments (for Pediatric Sleep Clinic): [phone] - option 1  Appointments (for Adult Sleep Clinic): [phone] - option 2  Appointments (For Sleep Studies): [phone] - option 3  Behavioral Sleep Medicine: [phone]  Sleep Surgery: [phone]  Nutrition Service: [phone]  Weight management clinics with endocrinology: [phone]  Bariatric Services: [phone] (includes weight loss medications and weight loss surgery)  [facility]: [phone] (offers holistic approaches to weight management)  ENT (Otolaryngology): [phone]  Headache Clinic (Neurology): [phone]  Neurology: [phone]  Psychiatry: [phone]  Pulmonary Function Testing (PFT) Center: [phone]  Pulmonary Medicine: [phone]  [employer] (DME): [phone]      OUR SLEEP TESTING LOCATIONS     Our team will contact you to schedule your sleep study, however, you can contact us as follow:  Main Phone Line (scheduling only): [phone] ([phone]), option 3  Adult and Pediatric Locations  [facility] (6 years and older): [facility] - 4th floor ([address] [place] [address]) After hours  "line: [phone]  [facility] (Main campus: All ages): [facility], 6th floor. After hours line: [phone]   [place] (5 years and older; younger considered on case-by-case basis): [address]; [address], [address]. Scheduling  After hours line: [phone]   [place] (6 years and older): [address]; [address]; [address]   [place] (6 years and older): [address], [address]  After hours line: [phone]   [place] (13 years and older) in [place]: [address], 2nd floor  After hours line: [phone]   [place] (13 year and older): [address]  After hours line: [phone]     Adult Only Locations:   [place] (18 years and older): [address] [place] [address], 2nd floor   [place] (18 years and older): [address] [facility]; 4th floor  After hours line: [phone]   [place] (18 years and older) at [place]: [address] [place] [address]  After hours line: [phone]     CONTACTING YOUR SLEEP MEDICINE PROVIDER AND SLEEP TEAM      For issues with your machine or mask interface, please call your DME provider first. DME stands for durable medical company. DME is the company who provides you the machine and/or PAP supplies / accessories.   To schedule, cancel, or reschedule SLEEP STUDY APPOINTMENTS, please call the Main Phone Line at [phone] ([phone] - option 3.   To schedule, cancel, or reschedule CLINIC APPOINTMENTS, you can do it in \"[employer]\", call [phone] to speak with my  (Niki Oconnor), or call the Main Phone Line at [phone] ([phone]) - option 2  For CLINICAL QUESTIONS or MEDICATION REFILLS, please call direct line for Adult Sleep Nurses at [phone].   Lastly, you can also send a message directly to your provider through \"My Chart\", which is the email service through your  Records Account: [email or web address]       Here at [facility], we wish you a restful " sleep!

## 2024-04-23 ENCOUNTER — APPOINTMENT (OUTPATIENT)
Dept: PRIMARY CARE | Facility: CLINIC | Age: 44
End: 2024-04-23
Payer: COMMERCIAL

## 2024-06-01 NOTE — PROGRESS NOTES
[facility] Sleep Medicine  [address]  [facility]  [address]  [facility] [address]   [facility] Sleep Medicine Clinic  Follow Up Visit Note           Subjective   Patient ID: Austin Seo is a 43 y.o. male with past medical history significant for Overweight, Obstructive sleep apnea.    6/6/24: UPDATED: The patient returned to the clinic and brought his Resmed CPAP to the clinic to review his initial compliance. His over 4 hours pap compliance rate was  83  %, and ESS is 4. today. He reports his Obstructive sleep apnea symptoms are getting better and denies snoring, witnessed apnea, and waking up gasping and choking for air after using pap. His wife also feels the same way, and she is happier. He has no issues with the pap, pressure, and mask and agrees to refill the annual supplies via [employer] to treat his Obstructive sleep apnea.     4/4/2024: The patient is here alone today and was referred by PCP Mendoza Lang MD, for comprehensive sleep medicine evaluation due to  Snoring. Pt has loud snoring chronically. Also, his daughter noted long pauses between breaths and gasping while asleep. Daytime energy is mildly low. The reports symptoms are worsening. It occurs with a frequency of daily. Pt has not tried anything. Today, he came to the sleep clinic to establish care to treat his Obstructive sleep apnea. His  ESS is 9, and  MIKE is 4 today.     HPI  Patient had been having these symptoms for the past 10 years.       SLEEP STUDY HISTORY: (personally reviewed raw data such as interpretation report, data sheet, hypnogram, and titration table if available and applicable)  3/16/24: Home Sleep Study: AHI 3%: 35.9/hr, Supine AHI 3%: 41.4/hr, Luis: 66.7%, <88%: 129.9 minutes.     SLEEP-WAKE SCHEDULE  Bedtime: 11 PM on weekdays, MN on weekends  Subjective sleep latency: 10 minutes  Problems falling asleep: no  Number of  awakenings: 0 times per night spontaneously for unknown reasons  Problems staying asleep: no  Final wake time: 6 AM on weekdays, 8 AM on weekends  Shift work: no  Naps: No  Average sleep duration (excluding naps): 7 hours     SLEEP ENVIRONMENT  Sleep location: bed  Sleep status: sleeps with wife  Room is dark:  Yes  Room is quiet: Yes  Room is cool: Yes  Bed comfort: good     SLEEP HABITS:   Activities before bedtime: watch TV  Activities in bed: watch TV  Preferred sleep position: back     SLEEP ROS: (after using pap)  Night symptoms: Denies for snoring, witnessed apnea, and wake up gasping and/or choking for air  Morning symptoms: Denies for unrefreshing sleep  Daytime symptoms: Denies for excessive daytime sleepiness and fatigue  Hypersomnia / narcolepsy symptoms: Patient denies symptoms of a hypersomnolence disorder such as sleep paralysis, sleep-related hallucinations, recurrent sleep attacks, automatic behaviors, and cataplexy.   RLS symptoms: Patient denies RLS symptoms.  Movements in sleep: Patient denies problematic movements in sleep.  Parasomnia symptoms: Patient denies symptoms of parasomnia.     WEIGHT: stable     REVIEW OF SYSTEMS: All other systems have been reviewed and are negative.     PERTINENT SOCIAL HISTORY:  Occupation: Manager at [employer]   Smoking: Yes   ETOH: No   Marijuana: No   Caffeine: Yes  Sleep aids: No   Claustrophobia: No      PERTINENT PAST SURGICAL HISTORY:  tonsillectomy     PERTINENT FAMILY HISTORY:  Cousin: Obstructive sleep apnea with CPAP     Active Problems, Allergy List, Medication List, and PMH/PSH/FH/Social Hx have been reviewed and reconciled in chart. No significant changes unless documented in the pertinent chart section. Updates made when necessary.          Objective   Vitals:    06/06/24 1148   BP: 120/79   Pulse: 73   Resp: 16   Temp: 36.5 °C (97.7 °F)   SpO2: 96%       Physical Exam  Constitutional:alert and oriented to time, place, and person  Lungs: Clear  to auscultation bilateral, no rales  Heart: Regular rate and rhythm, no murmurs     PAP Adherence:  DURABLE MEDICAL EQUIPMENT COMPANY: [employer]  Machine: THERAPY: RESMED AIRSENSE 11 PRESSURE SETTINGS: 5 - 15 cm H2O  Mask: MASK TYPE: P10 medium   Issues with therapy: ISSUES WITH THERAPY: Rain-out  Benefits with PAP: PERCEIVED BENEFITS OF PAP: refreshing sleep reduced daytime sleepiness decreased or no snoring better sleep quality    A PAP adherence download was obtained and data was reviewed personally today in clinic. (see scanned document in EPIC)          Assessment/Plan   Austin Seo is a 43 y.o. male who is seen to evaluate for severe obstructive sleep apnea. The pathophysiology of sleep apnea, diagnostic testing (HST vs PSG), treatment options (PAP, oral appliance, surgery, hypoglossal nerve stimulator called Inspire), and supportive management (weight loss, positional therapy, smoking cessation, avoidance of alcohol and sedatives) were discussed with the patient in detail. Risk factors of sleep apnea as well as cardiometabolic and neurocognitive sequelae associated with untreated sleep apnea were also discussed. Lastly, patient was advised to avoid driving vehicle or operating heavy machinery when sleepy.      Austin Seo with the following problems:     # OBSTRUCTIVE SLEEP APNEA: AHI 3%: 35.9/hr, Supine AHI 3%: 41.4/hr  -Good compliance, continue  5-15 cmH20  auto CPAP and order annual supplies through [employer].(Expedite it)  -Sleep apnea and PAP therapy education were provided at length in the clinic today. Austin Seo verbalized understanding.  -Emphasized diet, exercise, and weight loss in the clinic, as were non-supine sleep, avoiding alcohol in the late evening, and driving or operating heavy machinery when sleepy.  -Austin Seoverbalizes understanding of the above instructions and risks.      # OVERWEIGHT:  -with a BMI of 27.86. Austin Seo most recent  Bicarbonate was 30 on 7/21/23.        Bicarbonate   Date Value Ref Range Status   07/21/2023 30 21 - 32 mmol/L Final   -Encourage to have regular exercise to manage weight well.     # NASAL CONGESTION:  -Instruct Austin Goodrich use appropriate Flonase spray to ease congestion.     RTC 6 months        All of patient's questions were answered. He verbalizes understanding and agreement with my assessment and plan.

## 2024-06-06 ENCOUNTER — OFFICE VISIT (OUTPATIENT)
Dept: SLEEP MEDICINE | Facility: CLINIC | Age: 44
End: 2024-06-06
Payer: COMMERCIAL

## 2024-06-06 VITALS
HEIGHT: 74 IN | WEIGHT: 217 LBS | DIASTOLIC BLOOD PRESSURE: 79 MMHG | SYSTOLIC BLOOD PRESSURE: 120 MMHG | HEART RATE: 73 BPM | BODY MASS INDEX: 27.85 KG/M2 | RESPIRATION RATE: 16 BRPM | TEMPERATURE: 97.7 F | OXYGEN SATURATION: 96 %

## 2024-06-06 DIAGNOSIS — G47.33 OBSTRUCTIVE SLEEP APNEA (ADULT) (PEDIATRIC): ICD-10-CM

## 2024-06-06 DIAGNOSIS — E66.3 OVERWEIGHT (BMI 25.0-29.9): ICD-10-CM

## 2024-06-06 DIAGNOSIS — G47.33 OSA (OBSTRUCTIVE SLEEP APNEA): Primary | ICD-10-CM

## 2024-06-06 PROCEDURE — 1036F TOBACCO NON-USER: CPT

## 2024-06-06 PROCEDURE — 99214 OFFICE O/P EST MOD 30 MIN: CPT

## 2024-06-06 PROCEDURE — G2211 COMPLEX E/M VISIT ADD ON: HCPCS

## 2024-06-06 ASSESSMENT — SLEEP AND FATIGUE QUESTIONNAIRES
HOW LIKELY ARE YOU TO NOD OFF OR FALL ASLEEP WHILE WATCHING TV: SLIGHT CHANCE OF DOZING
HOW LIKELY ARE YOU TO NOD OFF OR FALL ASLEEP IN A CAR, WHILE STOPPED FOR A FEW MINUTES IN TRAFFIC: WOULD NEVER DOZE
HOW LIKELY ARE YOU TO NOD OFF OR FALL ASLEEP WHILE SITTING QUIETLY AFTER LUNCH WITHOUT ALCOHOL: WOULD NEVER DOZE
HOW LIKELY ARE YOU TO NOD OFF OR FALL ASLEEP WHEN YOU ARE A PASSENGER IN A CAR FOR AN HOUR WITHOUT A BREAK: WOULD NEVER DOZE
ESS-CHAD TOTAL SCORE: 4
HOW LIKELY ARE YOU TO NOD OFF OR FALL ASLEEP WHILE LYING DOWN TO REST IN THE AFTERNOON WHEN CIRCUMSTANCES PERMIT: SLIGHT CHANCE OF DOZING
HOW LIKELY ARE YOU TO NOD OFF OR FALL ASLEEP WHILE SITTING AND TALKING TO SOMEONE: WOULD NEVER DOZE
SITING INACTIVE IN A PUBLIC PLACE LIKE A CLASS ROOM OR A MOVIE THEATER: SLIGHT CHANCE OF DOZING
HOW LIKELY ARE YOU TO NOD OFF OR FALL ASLEEP WHILE SITTING AND READING: SLIGHT CHANCE OF DOZING

## 2024-06-06 NOTE — PATIENT INSTRUCTIONS
[facility] Sleep Medicine  [address]  [facility]  [address]  [facility] [address]       Thank you for coming to the Sleep Medicine Clinic today! Your sleep medicine provider today was: JESUS Barnett Below is a summary of your treatment plan, patient education, other important information, and our contact numbers.    Dear Austin Seo,    Thank you for visiting  Sleep Medicine Clinic !     1.You are doing great, we ordered the annual supplies for you. Feel free to contact your DME company to get new supplies.    2. Please do not drive when you are sleepy and  start practicing the sleep hygiene  as discussed in clinic today.     3. Please continue practicing the appropriate nasal spray at night to ease your nasal congestion as discussed in clinic today, and using Biotene to ease your dry mouth if needed.    4. FOR QUESTIONS AND CONCERNS:   a) : In case of problems with machine or mask interface, please contact your DME company first. [employer] is the company who provides you the machine and/or PAP supplies / accessories. If [employer] is your DME, you can reach them at [phone].   b): Please call my office with issues or questions: [phone] ([place]); [phone] ([facility]); [phone] ([place])    If you have a CPAP or BiPAP machine at home, please bring the unit and all accessories including the power cord to your appointments unless I tell you otherwise. Please have knowledge of the DME company you worked with to receive your PAP device. If you have copies of any previous sleep testing completed outside of , please bring with you to clinic as well. This information will make our visits more productive.     If you are new to CPAP or BiPAP, please note the minimum usage insurance requires to continuing coverage for the equipment as noted by your DME company. Please discuss equipment issues (PAP unit, mask fit,  humidification, etc.) with your [employer] company first.       In the event that you are running more than 10 minutes late to your appointment, I will kindly ask you to reschedule. Thanks.      TREATMENT PLAN     Follow-up Appointment:   Follow-up in 6 months.    PATIENT EDUCATION     OBSTRUCTIVE SLEEP APNEA (JESSICA) is a sleep disorder where your upper airway muscles relax during sleep and the airway intermittently and repetitively narrows and collapses leading to partially blocked airway (hypopnea) or completely blocked airway (apnea) which, in turn, can disrupt breathing in sleep, lower oxygen levels while you sleep and cause night time wakings. Because both apnea and hypopnea may cause higher carbon dioxide or low oxygen levels, untreated JESSICA can lead to heart arrhythmia, elevation of blood pressure, and make it harder for the body to consolidate memory and facilitate metabolism (leading to higher blood sugars at night). Frequent partial arousals occur during sleep resulting in sleep deprivation and daytime sleepiness. JESSICA is associated with an increased risk of cardiovascular disease, stroke, hypertension, and insulin resistance. Moreover, untreated JESSICA with excessive daytime sleepiness can increase the risk of motor vehicular accidents.    Below are conservative strategies for JESSICA regardless of JESSICA severity are:   Positional therapy - Avoid sleeping on your back.   Healthy diet and regular exercise to optimize weight is highly encouraged.   Avoid alcohol late in the evening and sedative-hypnotics as these substances can make sleep apnea worse.   Improve breathing through the nose with intranasal steroid spray, saline rinse, or antihistamines    Safety: Avoid driving vehicle and operating heavy equipment while sleepy. Drowsy driving may lead to life-threatening motor vehicle accidents. A person driving while sleepy is 5 times more likely to have an accident. If you feel sleepy, pull over and take a short power nap  (sleep for less than 30 minutes). Otherwise, ask somebody to drive you.    Treatment options for sleep apnea include weight management, positional therapy, Positive Airway Therapy (PAP) therapy, oral appliance therapy, hypoglossal nerve stimulator (Inspire) and select airway surgeries.    Starting Positive Airway Pressure (PAP): You were ordered a device to wear when you sleep called PAP (Positive Airway Pressure) to treat your sleep apnea. The order will be submitted to a durable medical equipment (DME) company who will arrange setting you up with the device. They will provide all the necessary equipment and discuss use and maintenance of the device with you as well as mask fitting and process of replacing / renewing PAP supplies or accessories. Once you get the machine, please start using it immediately. You may not be successful right away and that is okay. Freer be certain that you keep trying nightly and reach out to DME if you are struggling or having issues with machine usage.     *Please follow-up with me in 1-2 months of starting CPAP to see how well it is working for you and to do some troubleshooting if needed. Also, please bring all PAP equipment with you to follow up appointments unless told otherwise.     Important things to keep in mind as you start PAP:  Insurance will monitor your usage during the first 90 days. You should use your PAP - all night, every night, and including all naps (especially if naps are more than 30 minutes) for your health. The bare minimum is to use your PAP device while sleeping for at least 4 hours per day at least 5-6 days per week.. Otherwise, your PAP device will be reclaimed by your DME company at 90 days.  There are many masks to choose from to wear with your PAP machine. If you are not comfortable with the first mask issued to you, call your DME company and ask for another option to try. You typically have a 30-day mask guarantee from the day you received your machine.    Discuss with your provider if you are having issues breathing with the machine or if the temperature or humidity feel uncomfortable.  Expect to have an adjustment period when you start your device. It helps to continuing wearing the machine every day for a period of time until you get more used to it. You can practice with wearing the mask alone if you need, then add in the PAP air pressure a few days later.   Reach out for help if you are struggling! The sleep medicine department can be reached at [phone]  We encourage you to download data monitoring apps to your phone. For [employer] [address] [employer]. For [place] - [employer]. Both apps are available in the [employer] store for free and are a great tool to monitor your progress with your PAP device night to night.    Tips for success with PAP machine usage:  Comfortable and well-fitting mask  Appropriate pressure on the machine  Using humidification  Support from bed partner and clinical team      Maintaining your CPAP/BPAP device:    The humidification chamber (aka water tank or water chamber) needs to be filled with distilled water to prevent buildup of white deposits in the future. If you cannot find distilled water, you can use tap water but expect to have white deposits buildup seen after prolonged use with tap water. If you start seeing white deposits on the water chamber, you can clean it by filling it with equal parts of distilled white vinegar and water. Let the vinegar-water mixture sit for 2 hours, and then rinse it with running tap water. Clean with soap and water then let it dry.     You should try to keep your machine clean in order to work well. Here are some tips to clean PAP supplies / accessories:    Clean the humidification chamber (aka water tank) as well as your mask and tubing at least once a week with soap and water.   Alternatively, you can fill a sink or basin with warm water and add a little mild detergent, like  Ivory dish soap. Gently wipe your supplies with the soapy water to free all the oils and dirt that may have collected. Once that's done, rinse these items with clean water until the soap is gone and let them air dry. You can hang your tubing over the curtain juli in your bathroom so that it dries.  The mask insert (part of the mask that has contact with your skin) needs to be cleaned with soap and water daily. Another option is to wipe them down with CPAP wipes or baby wipes.    You should replace your mask and tubing frequently in order to prevent bacteria buildup, machine damage, and mask seal issues. The older the mask and hose, the high likelihood that there is bacteria buildup in it especially if they are not cleaned regularly. Dirty filters damage machines because build-up of dust and contaminants can cause machine to over-heat, and in time, damage the motor of machine. Cushions lose their seal over time as most masks are made of plastic and silicone while headgear is made of neoprene. These materials will break down with age and frequent use. Here is the recommended replacement schedule for PAP supplies / accessories:    Twice a month- disposable filters and cushions for nasal mask or nasal pillows.  Once a month- cushion for full face mask  Every 3 months- mask with headgear and PAP tubing (standard or heated hose)  Every 6 months- reusable filter, water chamber, and chin strap     Other useful information:    Some people do not put water in the tank while other people prefers to put water in the tank to prevent mouth dryness. Try to experiment to determine which is more comfortable for you.   In general, new machines have 2 years warranty on parts while health insurance allows you to have a new machine once every 5 years.     Common issues with PAP machine:    Mask gets dislodged when turning to the side: Consider getting a CPAP pillow or switching to a mask with hose on top.     Dry mouth:  Your machine has  "built-in humidifier that heats up the air to prevent dry mouth. It can be adjusted to your comfort. You can try that first and increase setting one level one night at a time to check which setting is comfortable and effective in lessening dry mouth. In some patients with heated hose, adjusting tube temperature to make air warmer can improve dry mouth. If dry mouth persists despite adjusting humidity or tube temperature setting, may apply OTC Biotene gel over the gums at bedtime.  If Biotene gel is not effective, consider trying XEROSTOM gel from [employer].  Also, eliminate or reduce dose of medications that can cause dry mouth if possible. Lastly, may try getting a separate room humidifier machine.    Airleaks: Please call DME as they may need to adjust your mask or refit you with a different kind or different size of mask. In addition, you can ask DME for tips on getting a good mask seal and mask fit.     Difficulty tolerating the mask: Contact your DME to try a different kind of mask and/or call office to get a referral to Sleep Psychologist for CPAP desensitization. CPAP desensitization technique is a set of strategies that helps patient cope with claustrophobia and anxiety related to wearing mask. Alternatively, we can do a daytime mini-sleep study called PAP-nap trial wherein you will try on different kinds of mask and the sleep technician will try different pressure settings on CPAP and BPAP machines to see which specific pressure is tolerable and comfortable for you.     Water droplets or moisture within the hose and/or mask: This is called rain-out and it is caused by condensation of too much heated humidity on the cooler walls of the hose. If you have rain-out, turn down humidity settings or get a heated hose. If you already have a heated hose, turn up the \"tube temperature\" of the heated hose. Alternatively, if you don't want to get a heated hose or warmer air, may wrap the CPAP hose with stockings to " keep it somewhat warm. Also, you need to place the machine on the floor and lower the hose so that water won't travel upward towards your mask.     You can also go to the following EDUCATION WEBSITES for further information:   American Academy of Sleep Medicine http://sleepeducation.org  National Sleep Foundation: https://sleepfoundation.org  American Sleep Apnea Association: https://www.sleepapnea.org (for patients with sleep apnea)  Narcolepsy Network: https://www.narcolepsynetwork.org (for patients with narcolepsy)  [employer]: https://www.CoolHotNot Corporationcolepsy.org (for patients with narcolepsy)  Hypersomnia Foundation: https://www.hypersomniafoundation.org (for patients with idiopathic hypersomnia)  RLS foundation: https://www.rls.org (for patients with restless leg syndrome)    IMPORTANT INFORMATION     Call 911 for medical emergencies.  Our offices are generally open from Monday-Friday, 8 am - 5 pm.   There are no supporting services by either the sleep doctors or their staff on weekends and Holidays, or after 5 PM on weekdays.   If you need to get in touch with me, you may either call my office number or you can use [employer].  If a referral for a test, for CPAP, or for another specialist was made, and you have not heard about scheduling this within a week, please call scheduling at [phone] ([phone]).  If you are unable to make your appointment for clinic or an overnight study, kindly call the office or sleep testing center at least 48 hours in advance to cancel and reschedule.  If you are on CPAP, please bring your device's card and/or the device to each clinic appointment.   In case of problems with PAP machine or mask interface, please contact your [employer] (Durable Medical Equipment) company first. [employer] is the company who provides you the machine and/or PAP supplies.       PRESCRIPTIONS     We require 7 days advanced notice for prescription refills. If we do not receive the request in this time, we cannot  guarantee that your medication will be refilled in time.    IMPORTANT PHONE NUMBERS     Sleep Medicine Clinic Fax: [phone]  Appointments (for Pediatric Sleep Clinic): [phone] - option 1  Appointments (for Adult Sleep Clinic): [phone] - option 2  Appointments (For Sleep Studies): [phone] - option 3  Behavioral Sleep Medicine: [phone]  Sleep Surgery: [phone]  Nutrition Service: [phone]  Weight management clinics with endocrinology: [phone]  Bariatric Services: [phone] (includes weight loss medications and weight loss surgery)  [facility]: [phone] (offers holistic approaches to weight management)  ENT (Otolaryngology): [phone]  Headache Clinic (Neurology): [phone]  Neurology: [phone]  Psychiatry: [phone]  Pulmonary Function Testing (PFT) Center: [phone]  Pulmonary Medicine: [phone]  [email or web address] ([employer]): [phone]      OUR SLEEP TESTING LOCATIONS     Our team will contact you to schedule your sleep study, however, you can contact us as follow:  Main Phone Line (scheduling only): [phone]), option 3  Adult and Pediatric Locations  [facility] (6 years and older): [facility] - 4th floor ([address] [place] [address]) After hours line: [phone]  [facility] (Main campus: All ages): [facility], 6th floor. After hours line: [phone]   [place] (5 years and older; younger considered on case-by-case basis): [address]; [address], [address]. Scheduling  After hours line: [phone]   [place] (6 years and older): [address]; [address]; [address]   [place] (6 years and older): [address] [facility], [address]  After hours line: [phone]   [place] (13 years and older) in [place]: [address] [facility], 2nd floor  After hours line: [phone]   [place] (13 year and older):  "[address]  After hours line: [phone]     Adult Only Locations:   [place] (18 years and older): [address] [facility], 2nd floor   [place] (18 years and older): [address] [facility]; 4th floor  After hours line: [phone]   [place] (18 years and older) at [place]: [address] [facility]  After hours line: [phone]     CONTACTING YOUR SLEEP MEDICINE PROVIDER AND SLEEP TEAM      For issues with your machine or mask interface, please call your DME provider first. DME stands for durable medical company. DME is the company who provides you the machine and/or PAP supplies / accessories.   To schedule, cancel, or reschedule SLEEP STUDY APPOINTMENTS, please call the Main Phone Line at [phone] - option 3.   To schedule, cancel, or reschedule CLINIC APPOINTMENTS, you can do it in \"[place] [facility]\", call [phone] to speak with my  (Niki Oconnor), or call the Main Phone Line at [phone] - option 2  For CLINICAL QUESTIONS or MEDICATION REFILLS, please call direct line for Adult Sleep Nurses at [phone].   Lastly, you can also send a message directly to your provider through \"My Chart\", which is the email service through your  Records Account: [email or web address]       Here at [facility], we wish you a restful sleep!   "

## 2024-07-24 ENCOUNTER — APPOINTMENT (OUTPATIENT)
Dept: PRIMARY CARE | Facility: CLINIC | Age: 44
End: 2024-07-24
Payer: COMMERCIAL

## 2024-07-24 ENCOUNTER — LAB (OUTPATIENT)
Dept: LAB | Facility: LAB | Age: 44
End: 2024-07-24
Payer: COMMERCIAL

## 2024-07-24 VITALS
WEIGHT: 222 LBS | SYSTOLIC BLOOD PRESSURE: 116 MMHG | HEART RATE: 64 BPM | DIASTOLIC BLOOD PRESSURE: 76 MMHG | BODY MASS INDEX: 28.49 KG/M2 | RESPIRATION RATE: 12 BRPM | HEIGHT: 74 IN | OXYGEN SATURATION: 96 %

## 2024-07-24 DIAGNOSIS — K64.4 EXTERNAL HEMORRHOIDS: ICD-10-CM

## 2024-07-24 DIAGNOSIS — E78.00 ELEVATED LDL CHOLESTEROL LEVEL: ICD-10-CM

## 2024-07-24 DIAGNOSIS — Z00.00 HEALTHCARE MAINTENANCE: Primary | ICD-10-CM

## 2024-07-24 DIAGNOSIS — Z00.00 HEALTHCARE MAINTENANCE: ICD-10-CM

## 2024-07-24 LAB
ALBUMIN SERPL BCP-MCNC: 4.7 G/DL (ref 3.4–5)
ALP SERPL-CCNC: 55 U/L (ref 33–120)
ALT SERPL W P-5'-P-CCNC: 32 U/L (ref 10–52)
ANION GAP SERPL CALC-SCNC: 12 MMOL/L (ref 10–20)
APPEARANCE UR: CLEAR
AST SERPL W P-5'-P-CCNC: 21 U/L (ref 9–39)
BILIRUB SERPL-MCNC: 0.4 MG/DL (ref 0–1.2)
BILIRUB UR STRIP.AUTO-MCNC: NEGATIVE MG/DL
BUN SERPL-MCNC: 15 MG/DL (ref 6–23)
CALCIUM SERPL-MCNC: 9.8 MG/DL (ref 8.6–10.6)
CHLORIDE SERPL-SCNC: 104 MMOL/L (ref 98–107)
CHOLEST SERPL-MCNC: 160 MG/DL (ref 0–199)
CHOLESTEROL/HDL RATIO: 3.5
CO2 SERPL-SCNC: 29 MMOL/L (ref 21–32)
COLOR UR: NORMAL
CREAT SERPL-MCNC: 0.95 MG/DL (ref 0.5–1.3)
EGFRCR SERPLBLD CKD-EPI 2021: >90 ML/MIN/1.73M*2
ERYTHROCYTE [DISTWIDTH] IN BLOOD BY AUTOMATED COUNT: 12.3 % (ref 11.5–14.5)
GLUCOSE SERPL-MCNC: 108 MG/DL (ref 74–99)
GLUCOSE UR STRIP.AUTO-MCNC: NORMAL MG/DL
HCT VFR BLD AUTO: 45.4 % (ref 41–52)
HCV AB SER QL: NONREACTIVE
HDLC SERPL-MCNC: 45.1 MG/DL
HGB BLD-MCNC: 15.7 G/DL (ref 13.5–17.5)
HOLD SPECIMEN: NORMAL
KETONES UR STRIP.AUTO-MCNC: NEGATIVE MG/DL
LDLC SERPL CALC-MCNC: 97 MG/DL
LEUKOCYTE ESTERASE UR QL STRIP.AUTO: NEGATIVE
MCH RBC QN AUTO: 31.3 PG (ref 26–34)
MCHC RBC AUTO-ENTMCNC: 34.6 G/DL (ref 32–36)
MCV RBC AUTO: 91 FL (ref 80–100)
NITRITE UR QL STRIP.AUTO: NEGATIVE
NON HDL CHOLESTEROL: 115 MG/DL (ref 0–149)
NRBC BLD-RTO: 0 /100 WBCS (ref 0–0)
PH UR STRIP.AUTO: 5.5 [PH]
PLATELET # BLD AUTO: 238 X10*3/UL (ref 150–450)
POTASSIUM SERPL-SCNC: 4.1 MMOL/L (ref 3.5–5.3)
PROT SERPL-MCNC: 7.3 G/DL (ref 6.4–8.2)
PROT UR STRIP.AUTO-MCNC: NEGATIVE MG/DL
PSA SERPL-MCNC: 0.58 NG/ML
RBC # BLD AUTO: 5.01 X10*6/UL (ref 4.5–5.9)
RBC # UR STRIP.AUTO: NEGATIVE /UL
SODIUM SERPL-SCNC: 141 MMOL/L (ref 136–145)
SP GR UR STRIP.AUTO: 1.02
TRIGL SERPL-MCNC: 91 MG/DL (ref 0–149)
TSH SERPL-ACNC: 1.49 MIU/L (ref 0.44–3.98)
UROBILINOGEN UR STRIP.AUTO-MCNC: NORMAL MG/DL
VLDL: 18 MG/DL (ref 0–40)
WBC # BLD AUTO: 6.1 X10*3/UL (ref 4.4–11.3)

## 2024-07-24 PROCEDURE — 86803 HEPATITIS C AB TEST: CPT

## 2024-07-24 PROCEDURE — 80061 LIPID PANEL: CPT

## 2024-07-24 PROCEDURE — 84153 ASSAY OF PSA TOTAL: CPT

## 2024-07-24 PROCEDURE — 99396 PREV VISIT EST AGE 40-64: CPT | Performed by: FAMILY MEDICINE

## 2024-07-24 PROCEDURE — 36415 COLL VENOUS BLD VENIPUNCTURE: CPT

## 2024-07-24 PROCEDURE — 3008F BODY MASS INDEX DOCD: CPT | Performed by: FAMILY MEDICINE

## 2024-07-24 PROCEDURE — 84443 ASSAY THYROID STIM HORMONE: CPT

## 2024-07-24 PROCEDURE — 1036F TOBACCO NON-USER: CPT | Performed by: FAMILY MEDICINE

## 2024-07-24 PROCEDURE — 81003 URINALYSIS AUTO W/O SCOPE: CPT

## 2024-07-24 PROCEDURE — 80053 COMPREHEN METABOLIC PANEL: CPT

## 2024-07-24 PROCEDURE — 85027 COMPLETE CBC AUTOMATED: CPT

## 2024-07-24 ASSESSMENT — PROMIS GLOBAL HEALTH SCALE
CARRYOUT_PHYSICAL_ACTIVITIES: COMPLETELY
RATE_AVERAGE_PAIN: 1
RATE_MENTAL_HEALTH: EXCELLENT
RATE_PHYSICAL_HEALTH: VERY GOOD
RATE_QUALITY_OF_LIFE: EXCELLENT
RATE_SOCIAL_SATISFACTION: VERY GOOD
RATE_GENERAL_HEALTH: GOOD
EMOTIONAL_PROBLEMS: NEVER
CARRYOUT_SOCIAL_ACTIVITIES: EXCELLENT

## 2024-07-24 ASSESSMENT — ENCOUNTER SYMPTOMS
FATIGUE: 0
DIFFICULTY URINATING: 0
CHILLS: 0
EYE REDNESS: 0
DIARRHEA: 0
DYSURIA: 0
SHORTNESS OF BREATH: 0
ABDOMINAL DISTENTION: 0
EYE PAIN: 0
BRUISES/BLEEDS EASILY: 0
BLOOD IN STOOL: 0
HEADACHES: 0
WEAKNESS: 0
ARTHRALGIAS: 0
NERVOUS/ANXIOUS: 0
SORE THROAT: 0
COUGH: 0
ADENOPATHY: 0
DIZZINESS: 0
CHEST TIGHTNESS: 0
APPETITE CHANGE: 0
BACK PAIN: 0
DYSPHORIC MOOD: 0
FEVER: 0
CONSTIPATION: 0
ABDOMINAL PAIN: 0

## 2024-07-24 NOTE — PROGRESS NOTES
"Subjective   Patient ID: Austin Seo is a 43 y.o. male who presents for Annual Exam.    HPI     Review of Systems    Objective   /76   Pulse 64   Resp 12   Ht 1.88 m (6' 2\")   Wt 101 kg (222 lb)   SpO2 96%   BMI 28.50 kg/m²     Physical Exam    Assessment/Plan          "

## 2024-07-24 NOTE — PROGRESS NOTES
"Subjective   Patient ID: Austin Seo is a 43 y.o. male who presents for Annual Exam.  PMHX, PSHx, Fam hx, and Social hx reviewed.   New concerns none  Vaccines HPV info giver  Dentist seen at least yearly yes  Vision concerns none  Hearing concerns none  Diet is usually overall healthy.   Smoker - no  Lung CT/screening - NA  AAA screening - NA  Alcohol use - 2 drinks per week  Exercising 4 days per week.   Sexually active - yes ,no issues  Colonoscopy NA               Review of Systems   Constitutional:  Negative for appetite change, chills, fatigue and fever.   HENT:  Negative for congestion, hearing loss and sore throat.    Eyes:  Negative for pain, redness and visual disturbance.   Respiratory:  Negative for cough, chest tightness and shortness of breath.    Cardiovascular:  Negative for chest pain and leg swelling.   Gastrointestinal:  Negative for abdominal distention, abdominal pain, blood in stool, constipation and diarrhea.   Genitourinary:  Negative for difficulty urinating and dysuria.   Musculoskeletal:  Negative for arthralgias and back pain.   Skin:  Negative for rash.   Neurological:  Negative for dizziness, weakness and headaches.   Hematological:  Negative for adenopathy. Does not bruise/bleed easily.   Psychiatric/Behavioral:  Negative for dysphoric mood. The patient is not nervous/anxious.        Objective   /76   Pulse 64   Resp 12   Ht 1.88 m (6' 2\")   Wt 101 kg (222 lb)   SpO2 96%   BMI 28.50 kg/m²    Physical Exam  Constitutional:       General: He is not in acute distress.     Appearance: Normal appearance. He is not ill-appearing.   HENT:      Head: Normocephalic and atraumatic.      Right Ear: Tympanic membrane, ear canal and external ear normal.      Left Ear: Tympanic membrane, ear canal and external ear normal.      Nose: Nose normal.      Mouth/Throat:      Mouth: Mucous membranes are moist.      Pharynx: No oropharyngeal exudate or posterior oropharyngeal erythema. "   Eyes:      Extraocular Movements: Extraocular movements intact.      Conjunctiva/sclera: Conjunctivae normal.      Pupils: Pupils are equal, round, and reactive to light.   Neck:      Thyroid: No thyroid mass or thyromegaly.      Vascular: No carotid bruit.   Cardiovascular:      Rate and Rhythm: Normal rate and regular rhythm.      Heart sounds: Normal heart sounds. No murmur heard.  Pulmonary:      Breath sounds: Normal breath sounds. No wheezing, rhonchi or rales.   Abdominal:      General: Bowel sounds are normal. There is no distension.      Palpations: Abdomen is soft. There is no mass.      Tenderness: There is no abdominal tenderness.   Genitourinary:     Prostate: Normal.      Rectum: Guaiac result negative.   Musculoskeletal:         General: No swelling or deformity.      Cervical back: Neck supple. No tenderness.   Lymphadenopathy:      Cervical: No cervical adenopathy.   Skin:     General: Skin is warm and dry.      Findings: No lesion or rash.   Neurological:      Mental Status: He is alert and oriented to person, place, and time.      Sensory: No sensory deficit.      Motor: No weakness.      Coordination: Coordination normal.      Deep Tendon Reflexes: Reflexes normal.   Psychiatric:         Mood and Affect: Mood normal.         Behavior: Behavior normal.         Judgment: Judgment normal.           Assessment/Plan   Diagnoses and all orders for this visit:  Healthcare maintenance - HPV vaccine info given, other vaccines current. Rechecking fasting labs.   External hemorrhoids - discussed high fiber diet / fiber supplement ( Ie. Benefiber 1 round tsp with 20oz water). Can continue OTC (Ie. PrepH) as needed.  Elevated LDL cholesterol level -  recommend low cholesterol diet and regular exercise.     Follow up in 1 year, physical

## 2024-07-26 ENCOUNTER — TELEPHONE (OUTPATIENT)
Dept: PRIMARY CARE | Facility: CLINIC | Age: 44
End: 2024-07-26
Payer: COMMERCIAL

## 2024-07-26 NOTE — TELEPHONE ENCOUNTER
----- Message from Mendoza Lang sent at 7/24/2024  6:33 PM EDT -----  Blood sugar is elevated in the prediabetes range. Recommend low carb diet, healthy snacking, and regular cardiovascular exercise. Other labs look good.  ----- Message -----  From: Lab, Background User  Sent: 7/24/2024  10:58 AM EDT  To: Mendoza Lang MD

## 2024-12-12 ENCOUNTER — APPOINTMENT (OUTPATIENT)
Dept: SLEEP MEDICINE | Facility: CLINIC | Age: 44
End: 2024-12-12
Payer: COMMERCIAL

## 2024-12-20 ENCOUNTER — HOSPITAL ENCOUNTER (OUTPATIENT)
Dept: RADIOLOGY | Facility: CLINIC | Age: 44
Discharge: HOME | End: 2024-12-20
Payer: COMMERCIAL

## 2024-12-20 ENCOUNTER — APPOINTMENT (OUTPATIENT)
Dept: PRIMARY CARE | Facility: CLINIC | Age: 44
End: 2024-12-20
Payer: COMMERCIAL

## 2024-12-20 VITALS
BODY MASS INDEX: 29.26 KG/M2 | HEIGHT: 74 IN | TEMPERATURE: 97.5 F | HEART RATE: 74 BPM | DIASTOLIC BLOOD PRESSURE: 80 MMHG | SYSTOLIC BLOOD PRESSURE: 122 MMHG | RESPIRATION RATE: 12 BRPM | OXYGEN SATURATION: 99 % | WEIGHT: 228 LBS

## 2024-12-20 DIAGNOSIS — M25.511 ACUTE PAIN OF RIGHT SHOULDER: ICD-10-CM

## 2024-12-20 DIAGNOSIS — Z87.891 FORMER SMOKER: ICD-10-CM

## 2024-12-20 DIAGNOSIS — M25.511 ACUTE PAIN OF RIGHT SHOULDER: Primary | ICD-10-CM

## 2024-12-20 DIAGNOSIS — M79.621 AXILLARY PAIN, RIGHT: ICD-10-CM

## 2024-12-20 PROCEDURE — 71046 X-RAY EXAM CHEST 2 VIEWS: CPT

## 2024-12-20 PROCEDURE — 99213 OFFICE O/P EST LOW 20 MIN: CPT | Performed by: FAMILY MEDICINE

## 2024-12-20 PROCEDURE — 3008F BODY MASS INDEX DOCD: CPT | Performed by: FAMILY MEDICINE

## 2024-12-20 PROCEDURE — 73030 X-RAY EXAM OF SHOULDER: CPT | Mod: RT

## 2024-12-20 ASSESSMENT — ENCOUNTER SYMPTOMS
DYSURIA: 0
CONSTIPATION: 0
NERVOUS/ANXIOUS: 0
FATIGUE: 0
COUGH: 0
ARTHRALGIAS: 0
CHEST TIGHTNESS: 0
BACK PAIN: 0
SHORTNESS OF BREATH: 0
FEVER: 0
EYE REDNESS: 0
DIARRHEA: 0
ADENOPATHY: 0
HEADACHES: 0
ABDOMINAL DISTENTION: 0
ABDOMINAL PAIN: 0
DIFFICULTY URINATING: 0
BRUISES/BLEEDS EASILY: 0
EYE PAIN: 0
SORE THROAT: 0
CHILLS: 0
DYSPHORIC MOOD: 0
APPETITE CHANGE: 0
DIZZINESS: 0
WEAKNESS: 0
BLOOD IN STOOL: 0

## 2024-12-20 NOTE — PROGRESS NOTES
"Subjective   Patient ID: Austin Seo is a 44 y.o. male who presents for axillary swelling.  Pt has about 2 months of R shoulder, deep axillary pain came on after workouts but unsure with exercise aggrevated it. It comes on after exercising. He never noted any lump, swelling or bruising in the axilla or shoulder.         Review of Systems   Constitutional:  Negative for appetite change, chills, fatigue and fever.   HENT:  Negative for congestion, hearing loss and sore throat.    Eyes:  Negative for pain, redness and visual disturbance.   Respiratory:  Negative for cough, chest tightness and shortness of breath.    Cardiovascular:  Negative for chest pain and leg swelling.   Gastrointestinal:  Negative for abdominal distention, abdominal pain, blood in stool, constipation and diarrhea.   Genitourinary:  Negative for difficulty urinating and dysuria.   Musculoskeletal:  Negative for arthralgias and back pain.   Skin:  Negative for rash.   Neurological:  Negative for dizziness, weakness and headaches.   Hematological:  Negative for adenopathy. Does not bruise/bleed easily.   Psychiatric/Behavioral:  Negative for dysphoric mood. The patient is not nervous/anxious.        Objective   /80   Pulse 74   Temp 36.4 °C (97.5 °F) (Temporal)   Resp 12   Ht 1.88 m (6' 2\")   Wt 103 kg (228 lb)   SpO2 99%   BMI 29.27 kg/m²    Physical Exam  Constitutional:       General: He is not in acute distress.     Appearance: Normal appearance.   Cardiovascular:      Rate and Rhythm: Normal rate and regular rhythm.      Heart sounds: Normal heart sounds. No murmur heard.  Pulmonary:      Effort: Pulmonary effort is normal.      Breath sounds: Normal breath sounds.   Abdominal:      Palpations: Abdomen is soft.      Tenderness: There is no abdominal tenderness.   Musculoskeletal:      Comments: R shoulder is nontender, with full ROM. There is no axillary swelling or lump.   Neurological:      Mental Status: He is alert. "   Psychiatric:         Mood and Affect: Mood normal.         Judgment: Judgment normal.           Assessment/Plan   Diagnoses and all orders for this visit:  Acute pain of right shoulder/Axillary pain, right - check XR. Continue with rest of upper body exercise and referring to orthopedics.    Follow up as planned

## 2024-12-20 NOTE — PROGRESS NOTES
"Subjective   Patient ID: Austin Seo is a 44 y.o. male who presents for axillary swelling.    HPI     Review of Systems    Objective   /80   Pulse 74   Temp 36.4 °C (97.5 °F) (Temporal)   Resp 12   Ht 1.88 m (6' 2\")   Wt 103 kg (228 lb)   SpO2 99%   BMI 29.27 kg/m²     Physical Exam    Assessment/Plan          "

## 2025-01-16 NOTE — PROGRESS NOTES
[facility]  [address]  [facility]  [address]  [facility] [id number]     [facility]  Follow Up Visit Note    Subjective   Patient ID: Austin Seo is a 44 y.o. male with past medical history significant for Overweight, Obstructive sleep apnea.    1/23/25: UPDATED: The patient returned to the clinic for 6 months follow up to review his pap compliance. His over 4 hours pap compliance rate was     %, and His ESS  is   today.      6/6/24: The patient returned to the clinic and brought his Resmed CPAP to the clinic to review his initial compliance. His over 4 hours pap compliance rate was  83  %, and ESS is 4. today. He reports his Obstructive sleep apnea symptoms are getting better and denies snoring, witnessed apnea, and waking up gasping and choking for air after using pap. His wife also feels the same way, and she is happier. He has no issues with the pap, pressure, and mask and agrees to refill the annual supplies via [employer] to treat his Obstructive sleep apnea.     4/4/2024: The patient is here alone today and was referred by PCP Mendoza Lang MD, for comprehensive sleep medicine evaluation due to  Snoring. Pt has loud snoring chronically. Also, his daughter noted long pauses between breaths and gasping while asleep. Daytime energy is mildly low. The reports symptoms are worsening. It occurs with a frequency of daily. Pt has not tried anything. Today, he came to the sleep clinic to establish care to treat his Obstructive sleep apnea. His  ESS is 9, and  MIKE is 4 today.     HPI  Patient had been having these symptoms for the past 10 years.       SLEEP STUDY HISTORY: (personally reviewed raw data such as interpretation report, data sheet, hypnogram, and titration table if available and applicable)  3/16/24: Home Sleep Study: AHI 3%: 35.9/hr, Supine AHI 3%: 41.4/hr, Luis:  66.7%, <88%: 129.9 minutes.     SLEEP-WAKE SCHEDULE  Bedtime: 11 PM on weekdays, MN on weekends  Subjective sleep latency: 10 minutes  Problems falling asleep: no  Number of awakenings: 0 times per night spontaneously for unknown reasons  Problems staying asleep: no  Final wake time: 6 AM on weekdays, 8 AM on weekends  Shift work: no  Naps: No  Average sleep duration (excluding naps): 7 hours     SLEEP ENVIRONMENT  Sleep location: bed  Sleep status: sleeps with wife  Room is dark:  Yes  Room is quiet: Yes  Room is cool: Yes  Bed comfort: good     SLEEP HABITS:   Activities before bedtime: watch TV  Activities in bed: watch TV  Preferred sleep position: back     SLEEP ROS: (after using pap)  Night symptoms: Denies for snoring, witnessed apnea, and wake up gasping and/or choking for air  Morning symptoms: Denies for unrefreshing sleep  Daytime symptoms: Denies for excessive daytime sleepiness and fatigue  Hypersomnia / narcolepsy symptoms: Patient denies symptoms of a hypersomnolence disorder such as sleep paralysis, sleep-related hallucinations, recurrent sleep attacks, automatic behaviors, and cataplexy.   RLS symptoms: Patient denies RLS symptoms.  Movements in sleep: Patient denies problematic movements in sleep.  Parasomnia symptoms: Patient denies symptoms of parasomnia.     WEIGHT: stable     REVIEW OF SYSTEMS: All other systems have been reviewed and are negative.     PERTINENT SOCIAL HISTORY:  Occupation: Manager at [employer]   Smoking: Yes   ETOH: No   Marijuana: No   Caffeine: Yes  Sleep aids: No   Claustrophobia: No      PERTINENT PAST SURGICAL HISTORY:  tonsillectomy     PERTINENT FAMILY HISTORY:  Cousin: Obstructive sleep apnea with CPAP     Active Problems, Allergy List, Medication List, and PMH/PSH/FH/Social Hx have been reviewed and reconciled in chart. No significant changes unless documented in the pertinent chart section. Updates made when necessary.          Objective       Vitals:      06/06/24 1148   BP: 120/79   Pulse: 73   Resp: 16   Temp: 36.5 °C (97.7 °F)   SpO2: 96%       Physical Exam  Constitutional:alert and oriented to time, place, and person  Lungs: Clear to auscultation bilateral, no rales  Heart: Regular rate and rhythm, no murmurs     PAP Adherence:  DURABLE MEDICAL EQUIPMENT COMPANY: [employer]  Machine: THERAPY: RESMED AIRSENSE 11 PRESSURE SETTINGS: 5 - 15 cm H2O  Mask: MASK TYPE: P10 medium   Issues with therapy: ISSUES WITH THERAPY: Rain-out  Benefits with PAP: PERCEIVED BENEFITS OF PAP: refreshing sleep reduced daytime sleepiness decreased or no snoring better sleep quality     A PAP adherence download was obtained and data was reviewed personally today in clinic. (see scanned document in EPIC)           Assessment/Plan   Austin Seo is a 43 y.o. male who is seen to evaluate for severe obstructive sleep apnea. The pathophysiology of sleep apnea, diagnostic testing (HST vs PSG), treatment options (PAP, oral appliance, surgery, hypoglossal nerve stimulator called Inspire), and supportive management (weight loss, positional therapy, smoking cessation, avoidance of alcohol and sedatives) were discussed with the patient in detail. Risk factors of sleep apnea as well as cardiometabolic and neurocognitive sequelae associated with untreated sleep apnea were also discussed. Lastly, patient was advised to avoid driving vehicle or operating heavy machinery when sleepy.      Austin Seo with the following problems:     # OBSTRUCTIVE SLEEP APNEA: AHI 3%: 35.9/hr, Supine AHI 3%: 41.4/hr  -Good compliance, continue  5-15 cmH20  auto CPAP and order annual supplies through [employer].(Expedite it)  -Sleep apnea and PAP therapy education were provided at length in the clinic today. Austin Seo verbalized understanding.  -Emphasized diet, exercise, and weight loss in the clinic, as were non-supine sleep, avoiding alcohol in the late evening, and driving or  operating heavy machinery when sleepy.  -Austin Seoverbalizes understanding of the above instructions and risks.      # OVERWEIGHT:  -with a BMI of 27.86. Austin Seo most recent Bicarbonate was 30 on 7/21/23.            Bicarbonate   Date Value Ref Range Status   07/21/2023 30 21 - 32 mmol/L Final   -Encourage to have regular exercise to manage weight well.     # NASAL CONGESTION:  -Instruct Austin Seoto use appropriate Flonase spray to ease congestion.     RTC 6 months

## 2025-01-23 ENCOUNTER — APPOINTMENT (OUTPATIENT)
Dept: SLEEP MEDICINE | Facility: CLINIC | Age: 45
End: 2025-01-23
Payer: COMMERCIAL

## 2025-03-05 NOTE — PROGRESS NOTES
[facility]  [address]  [facility]  [address]  [facility] [address]     [facility]  Follow Up Visit Note      Subjective   Patient ID: Austin Seo is a 44 y.o. male with past medical history significant for Overweight, and Obstructive sleep apnea.     3/13/2025: UPDATED: The patient is here {[id number]} and presents for 6 months follow-up . His ESS is   today     6/6/24: The patient returned to the clinic and brought his Resmed CPAP to the clinic to review his initial compliance. His over 4 hours pap compliance rate was  83  %, and ESS is 4. today. He reports his Obstructive sleep apnea symptoms are getting better and denies snoring, witnessed apnea, and waking up gasping and choking for air after using pap. His wife also feels the same way, and she is happier. He has no issues with the pap, pressure, and mask and agrees to refill the annual supplies via [employer] to treat his Obstructive sleep apnea.     4/4/2024: The patient is here alone today and was referred by PCP Mendoza Lang MD, for comprehensive sleep medicine evaluation due to  Snoring. Pt has loud snoring chronically. Also, his daughter noted long pauses between breaths and gasping while asleep. Daytime energy is mildly low. The reports symptoms are worsening. It occurs with a frequency of daily. Pt has not tried anything. Today, he came to the sleep clinic to establish care to treat his Obstructive sleep apnea. His  ESS is 9, and  MIKE is 4 today.     HPI  Patient had been having these symptoms for the past 10 years.       SLEEP STUDY HISTORY: (personally reviewed raw data such as interpretation report, data sheet, hypnogram, and titration table if available and applicable)  3/16/24: Home Sleep Study: AHI 3%: 35.9/hr, Supine AHI 3%: 41.4/hr, Luis: 66.7%, <88%: 129.9 minutes.     SLEEP-WAKE SCHEDULE  Bedtime:  11 PM on weekdays, MN on weekends  Subjective sleep latency: 10 minutes  Problems falling asleep: no  Number of awakenings: 0 times per night spontaneously for unknown reasons  Problems staying asleep: no  Final wake time: 6 AM on weekdays, 8 AM on weekends  Shift work: no  Naps: No  Average sleep duration (excluding naps): 7 hours     SLEEP ENVIRONMENT  Sleep location: bed  Sleep status: sleeps with wife  Room is dark:  Yes  Room is quiet: Yes  Room is cool: Yes  Bed comfort: good     SLEEP HABITS:   Activities before bedtime: watch TV  Activities in bed: watch TV  Preferred sleep position: back     SLEEP ROS: (after using pap)  Night symptoms: Denies for snoring, witnessed apnea, and wake up gasping and/or choking for air  Morning symptoms: Denies for unrefreshing sleep  Daytime symptoms: Denies for excessive daytime sleepiness and fatigue  Hypersomnia / narcolepsy symptoms: Patient denies symptoms of a hypersomnolence disorder such as sleep paralysis, sleep-related hallucinations, recurrent sleep attacks, automatic behaviors, and cataplexy.   RLS symptoms: Patient denies RLS symptoms.  Movements in sleep: Patient denies problematic movements in sleep.  Parasomnia symptoms: Patient denies symptoms of parasomnia.     WEIGHT: stable     REVIEW OF SYSTEMS: All other systems have been reviewed and are negative.     PERTINENT SOCIAL HISTORY:  Occupation: Manager at [employer]   Smoking: Yes   ETOH: No   Marijuana: No   Caffeine: Yes  Sleep aids: No   Claustrophobia: No      PERTINENT PAST SURGICAL HISTORY:  tonsillectomy     PERTINENT FAMILY HISTORY:  Cousin: Obstructive sleep apnea with CPAP     Active Problems, Allergy List, Medication List, and PMH/PSH/FH/Social Hx have been reviewed and reconciled in chart. No significant changes unless documented in the pertinent chart section. Updates made when necessary.     REVIEW OF SYSTEMS  All other systems have been reviewed and are negative.      ALLERGIES  No Known  Allergies    MEDICATIONS  Current Outpatient Medications   Medication Sig Dispense Refill    multivitamin tablet Take 1 tablet by mouth once daily.       No current facility-administered medications for this visit.             Objective       Physical Exam  Constitutional: Awake, not in distress  Lungs: Clear to auscultation bilateral, no rales  Heart: Regular rate and rhythm, no murmurs  Skin: Warm, no rash  Neuro: No tremors, moves all extremities  Psych: alert and oriented to time, place, and person    Assessment/Plan   Austin Seo is a 44 y.o. male presents today in [facility] with the following problems:    # OBSTRUCTIVE SLEEP APNEA: AHI 3%: 35.9/hr, Supine AHI 3%: 41.4/hr  -Good compliance, continue  5-15 cmH20  auto CPAP and order annual supplies through [employer].(Expedite it)  -Sleep apnea and PAP therapy education were provided at length in the clinic today. Austin Seo verbalized understanding.  -Emphasized diet, exercise, and weight loss in the clinic, as were non-supine sleep, avoiding alcohol in the late evening, and driving or operating heavy machinery when sleepy.  -Austin Seoverbalizes understanding of the above instructions and risks.      # OVERWEIGHT:  -with a BMI of 27.86. Austin Seo most recent Bicarbonate was 30 on 7/21/23.            Bicarbonate   Date Value Ref Range Status   07/21/2023 30 21 - 32 mmol/L Final   -Encourage to have regular exercise to manage weight well.     # NASAL CONGESTION:  -Instruct Austin Seoto use appropriate Flonase spray to ease congestion.     RTC       All of patient's questions were answered. He verbalizes understanding and agreement with my assessment and plan.      Please excuse any errors in grammar or translation related to dictation.

## 2025-03-13 ENCOUNTER — APPOINTMENT (OUTPATIENT)
Dept: SLEEP MEDICINE | Facility: CLINIC | Age: 45
End: 2025-03-13
Payer: COMMERCIAL

## 2025-06-03 NOTE — PROGRESS NOTES
[facility] Sleep Medicine  POR [address]  [facility]  [address]  [facility] [id number]     [facility] Sleep Medicine Clinic  Follow Up Visit Note      Subjective   Patient ID: Austin Seo is a 44 y.o. male with past medical history significant for Overweight, Obstructive sleep apnea.    6/19/2025: UPDATED: The patient is here alone today and presents for a follow-up of JESSICA on PAP, as well as a yearly checkup. He has no issues with his pap and no problems communicating with his DME. He reports that his sleep quality improved. His pap compliance rate over 4 hours was 77%, and his ESS is 1 today.     6/6/24: The patient returned to the clinic and brought his Resmed CPAP to the clinic to review his initial compliance. His over 4 hours pap compliance rate was  83  %, and ESS is 4. today. He reports his Obstructive sleep apnea symptoms are getting better and denies snoring, witnessed apnea, and waking up gasping and choking for air after using pap. His wife also feels the same way, and she is happier. He has no issues with the pap, pressure, and mask and agrees to refill the annual supplies via [employer] to treat his Obstructive sleep apnea.     4/4/2024: The patient is here alone today and was referred by PCP Mendoza Lang MD, for comprehensive sleep medicine evaluation due to  Snoring. Pt has loud snoring chronically. Also, his daughter noted long pauses between breaths and gasping while asleep. Daytime energy is mildly low. The reports symptoms are worsening. It occurs with a frequency of daily. Pt has not tried anything. Today, he came to the sleep clinic to establish care to treat his Obstructive sleep apnea. His  ESS is 9, and  MIKE is 4 today.     HPI  Patient had been having these symptoms for the past 10 years.       SLEEP STUDY HISTORY: (personally reviewed raw data such as interpretation report, data sheet,  hypnogram, and titration table if available and applicable)  3/16/24: Home Sleep Study: AHI 3%: 35.9/hr, Supine AHI 3%: 41.4/hr, Luis: 66.7%, <88%: 129.9 minutes.     SLEEP-WAKE SCHEDULE  Bedtime: 11 PM on weekdays, MN on weekends  Subjective sleep latency: 10 minutes  Problems falling asleep: no  Number of awakenings: 0 times per night spontaneously for unknown reasons  Problems staying asleep: no  Final wake time: 6 AM on weekdays, 8 AM on weekends  Shift work: no  Naps: No  Average sleep duration (excluding naps): 7 hours     SLEEP ENVIRONMENT  Sleep location: bed  Sleep status: sleeps with wife  Room is dark:  Yes  Room is quiet: Yes  Room is cool: Yes  Bed comfort: good     SLEEP HABITS:   Activities before bedtime: watch TV  Activities in bed: watch TV  Preferred sleep position: back     SLEEP ROS: (after using pap)  Night symptoms: Denies for snoring, witnessed apnea, and wake up gasping and/or choking for air  Morning symptoms: Denies for unrefreshing sleep  Daytime symptoms: Denies for excessive daytime sleepiness and fatigue  Hypersomnia / narcolepsy symptoms: Patient denies symptoms of a hypersomnolence disorder such as sleep paralysis, sleep-related hallucinations, recurrent sleep attacks, automatic behaviors, and cataplexy.   RLS symptoms: Patient denies RLS symptoms.  Movements in sleep: Patient denies problematic movements in sleep.  Parasomnia symptoms: Patient denies symptoms of parasomnia.     WEIGHT: stable     REVIEW OF SYSTEMS: All other systems have been reviewed and are negative.     PERTINENT SOCIAL HISTORY:  Occupation: Manager at [employer]   Smoking: Yes   ETOH: No   Marijuana: No   Caffeine: Yes  Sleep aids: No   Claustrophobia: No      PERTINENT PAST SURGICAL HISTORY:  tonsillectomy     PERTINENT FAMILY HISTORY:  Cousin: Obstructive sleep apnea with CPAP     Active Problems, Allergy List, Medication List, and PMH/PSH/FH/Social Hx have been reviewed and reconciled in chart. No  significant changes unless documented in the pertinent chart section. Updates made when necessary.        REVIEW OF SYSTEMS  All other systems have been reviewed and are negative.      ALLERGIES  Allergies[1]    MEDICATIONS  Current Medications[2]      Objective     Vitals:    06/19/25 1024   BP: 128/81   Pulse: 75   Resp: 16   Temp: 36.3 °C (97.3 °F)   SpO2: 98%        Physical Exam  Constitutional: Awake, not in distress  Lungs: Clear to auscultation bilateral, no rales  Heart: Regular rate and rhythm, no murmurs  Skin: Warm, no rash  Neuro: No tremors, moves all extremities  Psych: alert and oriented to time, place, and person    PAP Adherence:  DURABLE MEDICAL EQUIPMENT COMPANY: [employer]  Machine: THERAPY: RESMED AIRSENSE 11 PRESSURE SETTINGS: 5 - 15 cm H2O  Mask: Airfit P10 nasal pillow med   Issues with therapy: ISSUES WITH THERAPY: denies  Benefits with PAP: PERCEIVED BENEFITS OF PAP: refreshing sleep reduced daytime sleepiness decreased or no snoring better sleep quality decreased frequency of dry mouth    A PAP adherence download was obtained and data was reviewed personally today in clinic. (see scanned document in EPIC)           Assessment/Plan   Austin Seo is a 44 y.o. male presents today in [facility] with the following problems:    # OBSTRUCTIVE SLEEP APNEA: AHI 3%: 35.9/hr, Supine AHI 3%: 41.4/hr  -Good compliance, continue  5-15 cmH20  auto CPAP and order annual supplies through [employer].(Expedite it)  -Sleep apnea and PAP therapy education were provided at length in the clinic today. Austin Seo verbalized understanding.  -Emphasized diet, exercise, and weight loss in the clinic, as were non-supine sleep, avoiding alcohol in the late evening, and driving or operating heavy machinery when sleepy.  -Austin Seoverbalizes understanding of the above instructions and risks.      # OVERWEIGHT:  -with a BMI of 27.86. Austin Seo  most recent Bicarbonate was 30 on 7/21/23.            Bicarbonate   Date Value Ref Range Status   07/21/2023 30 21 - 32 mmol/L Final   -Encourage to have regular exercise to manage weight well.     # NASAL CONGESTION:  -Feel better     RTC 1 year       All of patient's questions were answered. He verbalizes understanding and agreement with my assessment and plan.    Please excuse any errors in grammar or translation related to dictation.           [1] No Known Allergies  [2]   Current Outpatient Medications   Medication Sig Dispense Refill    multivitamin tablet Take 1 tablet by mouth once daily.       No current facility-administered medications for this visit.

## 2025-06-19 ENCOUNTER — OFFICE VISIT (OUTPATIENT)
Dept: SLEEP MEDICINE | Facility: CLINIC | Age: 45
End: 2025-06-19
Payer: COMMERCIAL

## 2025-06-19 VITALS
HEART RATE: 75 BPM | HEIGHT: 74 IN | DIASTOLIC BLOOD PRESSURE: 81 MMHG | SYSTOLIC BLOOD PRESSURE: 128 MMHG | BODY MASS INDEX: 28.23 KG/M2 | WEIGHT: 220 LBS | TEMPERATURE: 97.3 F | OXYGEN SATURATION: 98 % | RESPIRATION RATE: 16 BRPM

## 2025-06-19 DIAGNOSIS — G47.33 OBSTRUCTIVE SLEEP APNEA (ADULT) (PEDIATRIC): ICD-10-CM

## 2025-06-19 DIAGNOSIS — G47.33 OSA (OBSTRUCTIVE SLEEP APNEA): Primary | ICD-10-CM

## 2025-06-19 DIAGNOSIS — E66.3 OVERWEIGHT (BMI 25.0-29.9): ICD-10-CM

## 2025-06-19 DIAGNOSIS — R09.81 NASAL CONGESTION: ICD-10-CM

## 2025-06-19 PROCEDURE — 1036F TOBACCO NON-USER: CPT

## 2025-06-19 PROCEDURE — 99213 OFFICE O/P EST LOW 20 MIN: CPT

## 2025-06-19 PROCEDURE — 3008F BODY MASS INDEX DOCD: CPT

## 2025-06-19 ASSESSMENT — SLEEP AND FATIGUE QUESTIONNAIRES
HOW LIKELY ARE YOU TO NOD OFF OR FALL ASLEEP WHILE LYING DOWN TO REST IN THE AFTERNOON WHEN CIRCUMSTANCES PERMIT: SLIGHT CHANCE OF DOZING
HOW LIKELY ARE YOU TO NOD OFF OR FALL ASLEEP IN A CAR, WHILE STOPPED FOR A FEW MINUTES IN TRAFFIC: WOULD NEVER DOZE
HOW LIKELY ARE YOU TO NOD OFF OR FALL ASLEEP WHEN YOU ARE A PASSENGER IN A CAR FOR AN HOUR WITHOUT A BREAK: WOULD NEVER DOZE
HOW LIKELY ARE YOU TO NOD OFF OR FALL ASLEEP WHILE SITTING QUIETLY AFTER LUNCH WITHOUT ALCOHOL: WOULD NEVER DOZE
SITING INACTIVE IN A PUBLIC PLACE LIKE A CLASS ROOM OR A MOVIE THEATER: WOULD NEVER DOZE
HOW LIKELY ARE YOU TO NOD OFF OR FALL ASLEEP WHILE WATCHING TV: WOULD NEVER DOZE
HOW LIKELY ARE YOU TO NOD OFF OR FALL ASLEEP WHILE SITTING AND TALKING TO SOMEONE: WOULD NEVER DOZE
HOW LIKELY ARE YOU TO NOD OFF OR FALL ASLEEP WHILE SITTING AND READING: WOULD NEVER DOZE
ESS-CHAD TOTAL SCORE: 1

## 2025-06-19 NOTE — PATIENT INSTRUCTIONS
[facility] Sleep Medicine  POR [address]  [facility]  [address]  [facility] [address]       Thank you for coming to the Sleep Medicine Clinic today! Your sleep medicine provider today was: JESUS Barnett Below is a summary of your treatment plan, patient education, other important information, and our contact numbers.    Dear Mr. Austin Seo       Your Sleep Provider Today: JESUS Barnett  Your Primary Care Physician: Mendoza Lang MD   Your Referring Provider: Colette Faith APRN-CNP    Diagnosis: OBSTRUCTIVE SLEEP APNEA       Thank you for visiting  Sleep Medicine Clinic !     1.You are doing great, we ordered the annual supplies for you. Feel free to contact your DME company to get new supplies.  2. Please do not drive when you are sleepy and start practicing the sleep hygiene as discussed in clinic.    3. Please continue practicing the appropriate nasal spray at night to ease your nasal congestion as discussed in clinic today, and using Biotene to ease your dry mouth if needed.    4. FOR QUESTIONS AND CONCERNS:   a) : In case of problems with machine or mask interface, please contact your DME company first. DME is the company who provides you the machine and/or PAP supplies / accessories. If [employer] is your DME, you can reach them at [phone].   b):  Please call my office with issues or questions: [phone] ([place]); [phone] ([facility]); [phone] ([place])    If you have a CPAP or BiPAP machine at home, please bring the unit and all accessories including the power cord to your appointments unless I tell you otherwise. Please have knowledge of the DME company you worked with to receive your PAP device. If you have copies of any previous sleep testing completed outside of , please bring with you to clinic as well. This information will make our visits more productive.     If you are  "new to CPAP or BiPAP, please note the minimum usage insurance requires to continuing coverage for the equipment as noted by your DME company. Please discuss equipment issues (PAP unit, mask fit, humidification, etc.) with your DME company first.       In the event that you are running more than 10 minutes late to your appointment, I will kindly ask you to reschedule. Thanks.      TREATMENT PLAN     - Continue current machine settings. Continue using machine every night all night.   - Renew PAP supplies. Order placed and sent to [employer].  - Please read the \"Patient Education\" section below for more detailed information. Try implementing tips, reminders, strategies, and supportive management.   - If not yet done, please sign up for [employer] to make a future schedule, send prescription requests, or send messages.    Follow-up Appointment:   Follow-up in 1 year.    PATIENT EDUCATION     OBSTRUCTIVE SLEEP APNEA (JESSICA) is a sleep disorder where your upper airway muscles relax during sleep and the airway intermittently and repetitively narrows and collapses leading to partially blocked airway (hypopnea) or completely blocked airway (apnea) which, in turn, can disrupt breathing in sleep, lower oxygen levels while you sleep and cause night time wakings. Because both apnea and hypopnea may cause higher carbon dioxide or low oxygen levels, untreated JESSICA can lead to heart arrhythmia, elevation of blood pressure, and make it harder for the body to consolidate memory and facilitate metabolism (leading to higher blood sugars at night). Frequent partial arousals occur during sleep resulting in sleep deprivation and daytime sleepiness. JESSICA is associated with an increased risk of cardiovascular disease, stroke, hypertension, and insulin resistance. Moreover, untreated JESSICA with excessive daytime sleepiness can increase the risk of motor vehicular accidents.    Below are conservative strategies for JESSICA regardless of JESSICA " severity are:   Positional therapy - Avoid sleeping on your back.   Healthy diet and regular exercise to optimize weight is highly encouraged.   Avoid alcohol late in the evening and sedative-hypnotics as these substances can make sleep apnea worse.   Improve breathing through the nose with intranasal steroid spray, saline rinse, or antihistamines    Safety: Avoid driving vehicle and operating heavy equipment while sleepy. Drowsy driving may lead to life-threatening motor vehicle accidents. A person driving while sleepy is 5 times more likely to have an accident. If you feel sleepy, pull over and take a short power nap (sleep for less than 30 minutes). Otherwise, ask somebody to drive you.    Treatment options for sleep apnea include weight management, positional therapy, Positive Airway Therapy (PAP) therapy, oral appliance therapy, hypoglossal nerve stimulator (Inspire) and select airway surgeries.    Starting Positive Airway Pressure (PAP): You were ordered a device to wear when you sleep called PAP (Positive Airway Pressure) to treat your sleep apnea. The order will be submitted to a durable medical equipment (DME) company who will arrange setting you up with the device. They will provide all the necessary equipment and discuss use and maintenance of the device with you as well as mask fitting and process of replacing / renewing PAP supplies or accessories. Once you get the machine, please start using it immediately. You may not be successful right away and that is okay. [place] be certain that you keep trying nightly and reach out to DME if you are struggling or having issues with machine usage.     *Please follow-up with me in 1-2 months of starting CPAP to see how well it is working for you and to do some troubleshooting if needed. Also, please bring all PAP equipment with you to follow up appointments unless told otherwise.     Important things to keep in mind as you start PAP:  Insurance will monitor your  usage during the first 90 days. You should use your PAP - all night, every night, and including all naps (especially if naps are more than 30 minutes) for your health. The bare minimum is to use your PAP device while sleeping for at least 4 hours per day at least 5-6 days per week.. Otherwise, your PAP device will be reclaimed by your DME company at 90 days.  There are many masks to choose from to wear with your PAP machine. If you are not comfortable with the first mask issued to you, call your DME company and ask for another option to try. You typically have a 30-day mask guarantee from the day you received your machine.   Discuss with your provider if you are having issues breathing with the machine or if the temperature or humidity feel uncomfortable.  Expect to have an adjustment period when you start your device. It helps to continuing wearing the machine every day for a period of time until you get more used to it. You can practice with wearing the mask alone if you need, then add in the PAP air pressure a few days later.   Reach out for help if you are struggling! The sleep medicine department can be reached at [phone]  We encourage you to download data monitoring apps to your phone. For [employer] 10/11 - MyAir meggan. For [employer] - DreamMapper. Both apps are available in the [employer] store for free and are a great tool to monitor your progress with your PAP device night to night.    Tips for success with PAP machine usage:  Comfortable and well-fitting mask  Appropriate pressure on the machine  Using humidification  Support from bed partner and clinical team      Maintaining your CPAP/BPAP device:    The humidification chamber (aka water tank or water chamber) needs to be filled with distilled water to prevent buildup of white deposits in the future. If you cannot find distilled water, you can use tap water but expect to have white deposits buildup seen after prolonged use with tap  water. If you start seeing white deposits on the water chamber, you can clean it by filling it with equal parts of distilled white vinegar and water. Let the vinegar-water mixture sit for 2 hours, and then rinse it with running tap water. Clean with soap and water then let it dry.     You should try to keep your machine clean in order to work well. Here are some tips to clean PAP supplies / accessories:    Clean the humidification chamber (aka water tank) as well as your mask and tubing at least once a week with soap and water.   Alternatively, you can fill a sink or basin with warm water and add a little mild detergent, like Ivory dish soap. Gently wipe your supplies with the soapy water to free all the oils and dirt that may have collected. Once that's done, rinse these items with clean water until the soap is gone and let them air dry. You can hang your tubing over the curtain juli in your bathroom so that it dries.  The mask insert (part of the mask that has contact with your skin) needs to be cleaned with soap and water daily. Another option is to wipe them down with CPAP wipes or baby wipes.    You should replace your mask and tubing frequently in order to prevent bacteria buildup, machine damage, and mask seal issues. The older the mask and hose, the high likelihood that there is bacteria buildup in it especially if they are not cleaned regularly. Dirty filters damage machines because build-up of dust and contaminants can cause machine to over-heat, and in time, damage the motor of machine. Cushions lose their seal over time as most masks are made of plastic and silicone while headgear is made of neoprene. These materials will break down with age and frequent use. Here is the recommended replacement schedule for PAP supplies / accessories:    Twice a month- disposable filters and cushions for nasal mask or nasal pillows.  Once a month- cushion for full face mask  Every 3 months- mask with headgear and PAP  tubing (standard or heated hose)  Every 6 months- reusable filter, water chamber, and chin strap     Other useful information:    Some people do not put water in the tank while other people prefers to put water in the tank to prevent mouth dryness. Try to experiment to determine which is more comfortable for you.   In general, new machines have 2 years warranty on parts while health insurance allows you to have a new machine once every 5 years.     Common issues with PAP machine:    Mask gets dislodged when turning to the side: Consider getting a CPAP pillow or switching to a mask with hose on top.     Dry mouth:  Your machine has built-in humidifier that heats up the air to prevent dry mouth. It can be adjusted to your comfort. You can try that first and increase setting one level one night at a time to check which setting is comfortable and effective in lessening dry mouth. In some patients with heated hose, adjusting tube temperature to make air warmer can improve dry mouth. If dry mouth persists despite adjusting humidity or tube temperature setting, may apply OTC Biotene gel over the gums at bedtime.  If Biotene gel is not effective, consider trying XEROSTOM gel from [employer].  Also, eliminate or reduce dose of medications that can cause dry mouth if possible. Lastly, may try getting a separate room humidifier machine.    Airleaks: Please call DME as they may need to adjust your mask or refit you with a different kind or different size of mask. In addition, you can ask DME for tips on getting a good mask seal and mask fit.     Difficulty tolerating the mask: Contact your DME to try a different kind of mask and/or call office to get a referral to Sleep Psychologist for CPAP desensitization. CPAP desensitization technique is a set of strategies that helps patient cope with claustrophobia and anxiety related to wearing mask. Alternatively, we can do a daytime mini-sleep study called PAP-nap trial wherein you  "will try on different kinds of mask and the sleep technician will try different pressure settings on CPAP and BPAP machines to see which specific pressure is tolerable and comfortable for you.     Water droplets or moisture within the hose and/or mask: This is called rain-out and it is caused by condensation of too much heated humidity on the cooler walls of the hose. If you have rain-out, turn down humidity settings or get a heated hose. If you already have a heated hose, turn up the \"tube temperature\" of the heated hose. Alternatively, if you don't want to get a heated hose or warmer air, may wrap the CPAP hose with stockings to keep it somewhat warm. Also, you need to place the machine on the floor and lower the hose so that water won't travel upward towards your mask.     You can also go to the following EDUCATION WEBSITES for further information:   American Academy of Sleep Medicine http://sleepeducation.org  National Sleep Foundation: https://sleepfoundation.org  American Sleep Apnea Association: https://www.sleepapnea.org (for patients with sleep apnea)  Narcolepsy Network: https://www.narcolepsynetwork.org (for patients with narcolepsy)  [employer]akeUp[employer]arcole[employer]: https://www.wakeupnarcolepsy.org (for patients with narcolepsy)  Hypersomnia Foundation: https://www.hypersomniafoundation.org (for patients with idiopathic hypersomnia)  RLS foundation: https://www.rls.org (for patients with restless leg syndrome)    IMPORTANT INFORMATION     Call 911 for medical emergencies.  Our offices are generally open from Monday-Friday, 8 am - 5 pm.   There are no supporting services by either the sleep doctors or their staff on weekends and Holidays, or after 5 PM on weekdays.   If you need to get in touch with me, you may either call my office number or you can use [employer].  If a referral for a test, for CPAP, or for another specialist was made, and you have not heard about scheduling this within a week, please call scheduling " at [phone]).  If you are unable to make your appointment for clinic or an overnight study, kindly call the office or sleep testing center at least 48 hours in advance to cancel and reschedule.  If you are on CPAP, please bring your device's card and/or the device to each clinic appointment.   In case of problems with PAP machine or mask interface, please contact your DME (Durable Medical Equipment) company first. DME is the company who provides you the machine and/or PAP supplies.       PRESCRIPTIONS     We require 7 days advanced notice for prescription refills. If we do not receive the request in this time, we cannot guarantee that your medication will be refilled in time.    IMPORTANT PHONE NUMBERS     Sleep Medicine Clinic Fax: [phone]  Appointments (for Pediatric Sleep Clinic): [phone] - option 1  Appointments (for Adult Sleep Clinic): [phone] - option 2  Appointments (For Sleep Studies): [phone] - option 3  Behavioral Sleep Medicine: [phone]  Sleep Surgery: [phone]  Nutrition Service: [phone]  Weight management clinics with endocrinology: [phone]  Bariatric Services: [phone] (includes weight loss medications and weight loss surgery)  [facility]: [phone] (offers holistic approaches to weight management)  ENT (Otolaryngology): [phone]  Headache Clinic (Neurology): [phone]  Neurology: [phone]  Psychiatry: [phone]  Pulmonary Function Testing (PFT) Center: [phone]  Pulmonary Medicine: [phone]  [employer] (DME): [phone]      OUR SLEEP TESTING LOCATIONS     Our team will contact you to schedule your sleep study, however, you can contact us as follow:  Main Phone Line (scheduling only): [phone]), option 3  Adult and Pediatric Locations  [facility] (6 years and older): [facility] - 4th floor ([address] [place] [address]) After hours  "line: [phone]  [facility] (Main campus: All ages): [facility], 6th floor. After hours line: [phone]   [place] (5 years and older; younger considered on case-by-case basis): [address]; [address], [address]. Scheduling  After hours line: [phone]   [place] (6 years and older): [address]; [address]; [address]   [place] (6 years and older): [address]  After hours line: [phone]   [place] (13 years and older) in [place]: [address], 2nd floor  After hours line: [phone]   [place] (13 year and older): [address]  After hours line: [phone]     Adult Only Locations:   [place] (18 years and older): [address] [place] [address], 2nd floor   [place] (18 years and older): [address] [facility]; 4th floor  After hours line: [phone]   [place] (18 years and older) at [place]: [id number] [place] [address]  After hours line: [phone]     CONTACTING YOUR SLEEP MEDICINE PROVIDER AND SLEEP TEAM      For issues with your machine or mask interface, please call your DME provider first. DME stands for durable medical company. DME is the company who provides you the machine and/or PAP supplies / accessories.   To schedule, cancel, or reschedule SLEEP STUDY APPOINTMENTS, please call the Main Phone Line at [phone] - option 3.   To schedule, cancel, or reschedule CLINIC APPOINTMENTS, you can do it in \"[employer]\", call [phone] to speak with my  (Niki Oconnor), or call the Main Phone Line at [phone] - option 2  For CLINICAL QUESTIONS or MEDICATION REFILLS, please call direct line for Adult Sleep Nurses at [phone].   Lastly, you can also send a message directly to your provider through \"My Chart\", which is the email service through your  Records Account: [email or web address]       Here at [facility], we wish you a restful " sleep!

## 2025-07-26 LAB
ALBUMIN SERPL-MCNC: 4.4 G/DL (ref 3.6–5.1)
ALP SERPL-CCNC: 76 U/L (ref 36–130)
ALT SERPL-CCNC: 46 U/L (ref 9–46)
ANION GAP SERPL CALCULATED.4IONS-SCNC: 7 MMOL/L (CALC) (ref 7–17)
AST SERPL-CCNC: 27 U/L (ref 10–40)
BILIRUB SERPL-MCNC: 0.4 MG/DL (ref 0.2–1.2)
BUN SERPL-MCNC: 14 MG/DL (ref 7–25)
CALCIUM SERPL-MCNC: 9.7 MG/DL (ref 8.6–10.3)
CHLORIDE SERPL-SCNC: 106 MMOL/L (ref 98–110)
CHOLEST SERPL-MCNC: 191 MG/DL
CHOLEST/HDLC SERPL: 3.9 (CALC)
CO2 SERPL-SCNC: 29 MMOL/L (ref 20–32)
CREAT SERPL-MCNC: 0.91 MG/DL (ref 0.6–1.29)
EGFRCR SERPLBLD CKD-EPI 2021: 107 ML/MIN/1.73M2
ERYTHROCYTE [DISTWIDTH] IN BLOOD BY AUTOMATED COUNT: 12.1 % (ref 11–15)
GLUCOSE SERPL-MCNC: 106 MG/DL (ref 65–99)
HCT VFR BLD AUTO: 46.5 % (ref 38.5–50)
HDLC SERPL-MCNC: 49 MG/DL
HGB BLD-MCNC: 15.8 G/DL (ref 13.2–17.1)
LDLC SERPL CALC-MCNC: 126 MG/DL (CALC)
MCH RBC QN AUTO: 31.2 PG (ref 27–33)
MCHC RBC AUTO-ENTMCNC: 34 G/DL (ref 32–36)
MCV RBC AUTO: 91.7 FL (ref 80–100)
NONHDLC SERPL-MCNC: 142 MG/DL (CALC)
PLATELET # BLD AUTO: 282 THOUSAND/UL (ref 140–400)
PMV BLD REES-ECKER: 10.8 FL (ref 7.5–12.5)
POTASSIUM SERPL-SCNC: 5.1 MMOL/L (ref 3.5–5.3)
PROT SERPL-MCNC: 6.9 G/DL (ref 6.1–8.1)
PSA SERPL-MCNC: 0.57 NG/ML
RBC # BLD AUTO: 5.07 MILLION/UL (ref 4.2–5.8)
SODIUM SERPL-SCNC: 142 MMOL/L (ref 135–146)
TRIGL SERPL-MCNC: 64 MG/DL
WBC # BLD AUTO: 7.1 THOUSAND/UL (ref 3.8–10.8)

## 2025-07-30 ENCOUNTER — APPOINTMENT (OUTPATIENT)
Dept: PRIMARY CARE | Facility: CLINIC | Age: 45
End: 2025-07-30
Payer: COMMERCIAL

## 2025-07-30 VITALS
RESPIRATION RATE: 12 BRPM | HEIGHT: 74 IN | HEART RATE: 66 BPM | WEIGHT: 224 LBS | BODY MASS INDEX: 28.75 KG/M2 | SYSTOLIC BLOOD PRESSURE: 122 MMHG | OXYGEN SATURATION: 98 % | DIASTOLIC BLOOD PRESSURE: 78 MMHG

## 2025-07-30 DIAGNOSIS — E78.00 ELEVATED LDL CHOLESTEROL LEVEL: ICD-10-CM

## 2025-07-30 DIAGNOSIS — G47.33 OSA ON CPAP: ICD-10-CM

## 2025-07-30 DIAGNOSIS — M25.511 CHRONIC RIGHT SHOULDER PAIN: ICD-10-CM

## 2025-07-30 DIAGNOSIS — Z00.00 HEALTHCARE MAINTENANCE: Primary | ICD-10-CM

## 2025-07-30 DIAGNOSIS — G89.29 CHRONIC RIGHT SHOULDER PAIN: ICD-10-CM

## 2025-07-30 DIAGNOSIS — R73.01 IMPAIRED FASTING GLUCOSE: ICD-10-CM

## 2025-07-30 PROCEDURE — 99396 PREV VISIT EST AGE 40-64: CPT | Performed by: FAMILY MEDICINE

## 2025-07-30 PROCEDURE — 3008F BODY MASS INDEX DOCD: CPT | Performed by: FAMILY MEDICINE

## 2025-07-30 ASSESSMENT — ENCOUNTER SYMPTOMS
SORE THROAT: 0
APPETITE CHANGE: 0
BRUISES/BLEEDS EASILY: 0
CHILLS: 0
NERVOUS/ANXIOUS: 0
DIZZINESS: 0
FEVER: 0
DYSURIA: 0
EYE PAIN: 0
FATIGUE: 0
CHEST TIGHTNESS: 0
ADENOPATHY: 0
DIFFICULTY URINATING: 0
COUGH: 0
ABDOMINAL DISTENTION: 0
DYSPHORIC MOOD: 0
WEAKNESS: 0
CONSTIPATION: 0
DIARRHEA: 0
ARTHRALGIAS: 1
ABDOMINAL PAIN: 0
HEADACHES: 0
EYE REDNESS: 0
BLOOD IN STOOL: 0
SHORTNESS OF BREATH: 0
BACK PAIN: 0

## 2025-07-30 ASSESSMENT — PATIENT HEALTH QUESTIONNAIRE - PHQ9
8. MOVING OR SPEAKING SO SLOWLY THAT OTHER PEOPLE COULD HAVE NOTICED. OR THE OPPOSITE, BEING SO FIGETY OR RESTLESS THAT YOU HAVE BEEN MOVING AROUND A LOT MORE THAN USUAL: NOT AT ALL
5. POOR APPETITE OR OVEREATING: NOT AT ALL
SUM OF ALL RESPONSES TO PHQ9 QUESTIONS 1 AND 2: 0
7. TROUBLE CONCENTRATING ON THINGS, SUCH AS READING THE NEWSPAPER OR WATCHING TELEVISION: NOT AT ALL
9. THOUGHTS THAT YOU WOULD BE BETTER OFF DEAD, OR OF HURTING YOURSELF: NOT AT ALL
6. FEELING BAD ABOUT YOURSELF - OR THAT YOU ARE A FAILURE OR HAVE LET YOURSELF OR YOUR FAMILY DOWN: NOT AT ALL
SUM OF ALL RESPONSES TO PHQ QUESTIONS 1-9: 0
1. LITTLE INTEREST OR PLEASURE IN DOING THINGS: NOT AT ALL
4. FEELING TIRED OR HAVING LITTLE ENERGY: NOT AT ALL
2. FEELING DOWN, DEPRESSED OR HOPELESS: NOT AT ALL
3. TROUBLE FALLING OR STAYING ASLEEP OR SLEEPING TOO MUCH: NOT AT ALL

## 2025-07-30 NOTE — PROGRESS NOTES
"Subjective   Patient ID: Austin Seo is a 44 y.o. male who presents for Annual Exam.  PMHX, PSHx, Fam hx, and Social hx reviewed.   New concerns  - labs showed mildly elevated  and LDL chol 126  Vaccines - TDAP shot recommended  Dentist seen at least yearly yes  Vision concerns none  Hearing concerns none, chornic tinnitus unchanged  Diet is marginally healthy.   Smoker - no  Lung CT/screening - NA  AAA screening - NA  Alcohol use - averages 2 drinks per week  Exercising 0 days per week, walking.   Sexually active - yes, no issues  Colonoscopy NA               Review of Systems   Constitutional:  Negative for appetite change, chills, fatigue and fever.   HENT:  Negative for congestion, hearing loss and sore throat.    Eyes:  Negative for pain, redness and visual disturbance.   Respiratory:  Negative for cough, chest tightness and shortness of breath.    Cardiovascular:  Negative for chest pain and leg swelling.   Gastrointestinal:  Negative for abdominal distention, abdominal pain, blood in stool, constipation and diarrhea.   Genitourinary:  Negative for difficulty urinating and dysuria.   Musculoskeletal:  Positive for arthralgias (chronic R shoulder pain is worse since strain about 1 month ago). Negative for back pain.   Skin:  Negative for rash.   Neurological:  Negative for dizziness, weakness and headaches.   Hematological:  Negative for adenopathy. Does not bruise/bleed easily.   Psychiatric/Behavioral:  Negative for dysphoric mood. The patient is not nervous/anxious.        Objective   /78   Pulse 66   Resp 12   Ht 1.88 m (6' 2\")   Wt 102 kg (224 lb)   SpO2 98%   BMI 28.76 kg/m²    Physical Exam  Constitutional:       General: He is not in acute distress.     Appearance: Normal appearance. He is not ill-appearing.   HENT:      Head: Normocephalic and atraumatic.      Right Ear: Tympanic membrane, ear canal and external ear normal. There is no impacted cerumen.      Left Ear: Tympanic " membrane, ear canal and external ear normal. There is no impacted cerumen.      Nose: Nose normal. No congestion.      Mouth/Throat:      Mouth: Mucous membranes are moist.      Pharynx: No oropharyngeal exudate or posterior oropharyngeal erythema.     Eyes:      Conjunctiva/sclera: Conjunctivae normal.     Neck:      Thyroid: No thyroid mass or thyromegaly.      Vascular: No carotid bruit.     Cardiovascular:      Rate and Rhythm: Normal rate and regular rhythm.      Heart sounds: Normal heart sounds. No murmur heard.  Pulmonary:      Breath sounds: Normal breath sounds. No wheezing, rhonchi or rales.   Abdominal:      General: Bowel sounds are normal. There is no distension.      Palpations: Abdomen is soft. There is no mass.      Tenderness: There is no abdominal tenderness.   Genitourinary:     Comments: He declines JACKELYN    Musculoskeletal:         General: No swelling or deformity.      Cervical back: Neck supple. No tenderness.   Lymphadenopathy:      Cervical: No cervical adenopathy.     Skin:     General: Skin is warm and dry.      Findings: No lesion or rash.     Neurological:      Mental Status: He is alert and oriented to person, place, and time.      Sensory: No sensory deficit.      Motor: No weakness.      Coordination: Coordination normal.      Deep Tendon Reflexes: Reflexes normal.     Psychiatric:         Mood and Affect: Mood normal.         Behavior: Behavior normal.         Judgment: Judgment normal.           Assessment/Plan   Diagnoses and all orders for this visit:  Healthcare maintenance - Tetanus shot recommended. Other vaccines current. Labs reviewed and discussed. Will plan on CAC score and Colon screening next year.  Impaired fasting glucose/Elevated LDL cholesterol level - recommend low carb diet, healthy snacking and regular exercise.   JESSICA on CPAP - doing well continue.  R shoulder pain - discussed rest and ROM exercise, if shoulder pain not improved by 1 month would consider  orthopedics consult    Follow up in 1 year for physical

## 2025-07-30 NOTE — PROGRESS NOTES
"Subjective   Patient ID: Austin Seo is a 44 y.o. male who presents for Annual Exam.    HPI     Review of Systems    Objective   /78   Pulse 66   Resp 12   Ht 1.88 m (6' 2\")   Wt 102 kg (224 lb)   SpO2 98%   BMI 28.76 kg/m²     Physical Exam    Assessment/Plan          "

## 2026-08-05 ENCOUNTER — APPOINTMENT (OUTPATIENT)
Dept: PRIMARY CARE | Facility: CLINIC | Age: 46
End: 2026-08-05
Payer: COMMERCIAL